# Patient Record
Sex: MALE | Race: BLACK OR AFRICAN AMERICAN | NOT HISPANIC OR LATINO | Employment: FULL TIME | URBAN - METROPOLITAN AREA
[De-identification: names, ages, dates, MRNs, and addresses within clinical notes are randomized per-mention and may not be internally consistent; named-entity substitution may affect disease eponyms.]

---

## 2022-03-08 ENCOUNTER — HOSPITAL ENCOUNTER (EMERGENCY)
Facility: HOSPITAL | Age: 37
Discharge: HOME | End: 2022-03-08
Attending: EMERGENCY MEDICINE
Payer: COMMERCIAL

## 2022-03-08 VITALS
TEMPERATURE: 98 F | DIASTOLIC BLOOD PRESSURE: 102 MMHG | HEART RATE: 89 BPM | WEIGHT: 190 LBS | OXYGEN SATURATION: 99 % | BODY MASS INDEX: 23.62 KG/M2 | HEIGHT: 75 IN | SYSTOLIC BLOOD PRESSURE: 173 MMHG | RESPIRATION RATE: 18 BRPM

## 2022-03-08 DIAGNOSIS — R52 PAIN: ICD-10-CM

## 2022-03-08 DIAGNOSIS — R10.32 LEFT INGUINAL PAIN: Primary | ICD-10-CM

## 2022-03-08 PROCEDURE — 99284 EMERGENCY DEPT VISIT MOD MDM: CPT | Mod: 25

## 2022-03-08 PROCEDURE — 96372 THER/PROPH/DIAG INJ SC/IM: CPT

## 2022-03-08 PROCEDURE — 63600175 PHARM REV CODE 636 W HCPCS: Performed by: EMERGENCY MEDICINE

## 2022-03-08 RX ORDER — DEXAMETHASONE SODIUM PHOSPHATE 4 MG/ML
8 INJECTION, SOLUTION INTRA-ARTICULAR; INTRALESIONAL; INTRAMUSCULAR; INTRAVENOUS; SOFT TISSUE
Status: COMPLETED | OUTPATIENT
Start: 2022-03-08 | End: 2022-03-08

## 2022-03-08 RX ORDER — PREDNISONE 20 MG/1
40 TABLET ORAL DAILY
Qty: 10 TABLET | Refills: 0 | Status: SHIPPED | OUTPATIENT
Start: 2022-03-08 | End: 2022-03-13

## 2022-03-08 RX ADMIN — DEXAMETHASONE SODIUM PHOSPHATE 8 MG: 4 INJECTION, SOLUTION INTRA-ARTICULAR; INTRALESIONAL; INTRAMUSCULAR; INTRAVENOUS; SOFT TISSUE at 01:03

## 2022-03-08 NOTE — ED PROVIDER NOTES
Encounter Date: 3/8/2022       History     Chief Complaint   Patient presents with    Hip Pain     L hip pain x 18 hours, reports no injury      Patient presents complaining of left upper groin pain.  Patient states pain is worse with walking.  Symptoms have been present for the last 2 days.  He denies any trauma.  Denies any fever.  He denies any testicular pain.  He denies any abdominal pain no nausea vomiting.  No fever.        Review of patient's allergies indicates:   Allergen Reactions    [place]     Avocado (laurus persea)     Tree nuts      No past medical history on file.  No past surgical history on file.  No family history on file.     Review of Systems   All other systems reviewed and are negative.      Physical Exam     Initial Vitals [03/08/22 1235]   BP Pulse Resp Temp SpO2   (!) 197/133 98 18 98 °F (36.7 °C) 99 %      MAP       --         Physical Exam    Nursing note and vitals reviewed.  Constitutional: He appears well-developed and well-nourished. He is not diaphoretic. No distress.   Pleasant, polite.   HENT:   Head: Normocephalic and atraumatic.   Eyes: EOM are normal.   Neck: Neck supple.   Normal range of motion.  Pulmonary/Chest: No respiratory distress.   Musculoskeletal:      Cervical back: Normal range of motion and neck supple.      Comments: Patient has full range of motion of the left leg.  There is no erythema warmth or cellulitis to the groin.  There is no evidence of David's.  There is no crepitus.  There is a good pulse.  There is no abdominal tenderness.  He is neurovascularly intact.     Neurological: He is alert.   Skin: Skin is warm and dry.   Psychiatric: He has a normal mood and affect. His behavior is normal. Judgment and thought content normal.         ED Course   Procedures  Labs Reviewed - No data to display       Imaging Results          US Lower Extremity Veins Left (Final result)  Result time 03/08/22 15:29:22    Final result by Urban Brooks MD (03/08/22  15:29:22)                 Narrative:    VENOUS DOPPLER ULTRASOUND LEFT LOWER EXTREMITY    CLINICAL DATA: Left hip pain    FINDINGS: Color and duplex Doppler sonographic assessment with spectral analysis of the left lower extremity demonstrates no evidence of deep vein thrombosis. Normal color Doppler flow, augmentation, and compressibility are noted at all levels.    IMPRESSION:  1. No evidence of DVT in the left lower extremity.    Electronically signed by:  Urban Brooks MD  3/8/2022 3:29 PM CST Workstation: [id number]                             X-Ray Hip 2 or 3 views Left (with Pelvis when performed) (Final result)  Result time 03/08/22 13:21:26    Final result by Jonathon Dorado MD (03/08/22 13:21:26)                 Narrative:    CLINICAL HISTORY:  36 years (1985) Male pain LEFT HIP PAIN X 18 HOURS, NO TRAUMA    TECHNIQUE:  XR HIP 2 OR MORE VIEWS UNILATERAL WITH PELVIS. 4 image(s) obtained.    COMPARISON:  None available.    FINDINGS:  No acute fracture or dislocation. The hip joints, pubic symphysis and SI joints are congruent. There are small overhanging acetabular osteophytes with mild buttressing the superior lateral margin of the femoral necks, a nonspecific finding which may suggest femoral acetabular impingement or very mild osteoarthrosis. The hip joint spaces appear to be symmetric/maintained. Tiny rounded calcifications are seen in the pelvis suggestive of phleboliths. Soft tissues are radiographically within normal limits with no radiopaque foreign body seen.    IMPRESSION:  No acute fracture or dislocation.                  .    Electronically signed by:  Jonathon Dorado MD  3/8/2022 1:21 PM CST Workstation: [id number]                               Medications   dexamethasone injection 8 mg (8 mg Intramuscular Given 3/8/22 1337)     Medical Decision Making:   Differential Diagnosis:   DVT, fracture, dislocation, musculoskeletal pain             ED Course as of 03/08/22 2120   Tue Mar  08, 2022   1536 There is no evidence of DVT on ultrasound and x-ray is within normal limits.  It seems most likely patient experiencing musculoskeletal pain to the left upper groin.  Advised patient prednisone for the next 5 days and gave Decadron shot emergency department.  Additionally patient's blood pressure elevated in the emergency department I advised patient to follow-up with primary care for repeat blood pressure check.  Patient was discharged stable condition.  Detailed return precautions discussed. [AP]      ED Course User Index  [AP] Diogenes Rider MD             Clinical Impression:   Final diagnoses:  [R52] Pain  [R10.32] Left inguinal pain (Primary)          ED Disposition Condition    Discharge Stable        ED Prescriptions     Medication Sig Dispense Start Date End Date Auth. Provider    predniSONE (DELTASONE) 20 MG tablet Take 2 tablets (40 mg total) by mouth once daily. for 5 days 10 tablet 3/8/2022 3/13/2022 Diogenes Rider MD        Follow-up Information     Follow up With Specialties Details Why Contact Info    [facility]  In 1 week  [address] [place] [id number]  [phone]             Diogenes Rider MD  03/08/22 7216

## 2022-03-08 NOTE — DISCHARGE INSTRUCTIONS
Return to the ER if any worsening pain, nausea, vomiting, fever, abdominal pain or any concern.  Keep a log of your Blood pressure readings.  Take it twice a day.  Once in the morning and once at night.  Log date, time, and reading and bring it with you to your follow up visit with your primary care provider.

## 2022-03-11 ENCOUNTER — HOSPITAL ENCOUNTER (EMERGENCY)
Facility: HOSPITAL | Age: 37
Discharge: HOME | End: 2022-03-11
Attending: EMERGENCY MEDICINE
Payer: COMMERCIAL

## 2022-03-11 VITALS
BODY MASS INDEX: 24.37 KG/M2 | TEMPERATURE: 98 F | SYSTOLIC BLOOD PRESSURE: 147 MMHG | OXYGEN SATURATION: 97 % | WEIGHT: 195 LBS | HEART RATE: 116 BPM | RESPIRATION RATE: 17 BRPM | DIASTOLIC BLOOD PRESSURE: 95 MMHG

## 2022-03-11 DIAGNOSIS — S76.012D STRAIN OF FLEXOR MUSCLE OF LEFT HIP, SUBSEQUENT ENCOUNTER: Primary | ICD-10-CM

## 2022-03-11 LAB
CK SERPL-CCNC: 83 U/L (ref 20–200)
CRP SERPL-MCNC: 0.06 MG/DL

## 2022-03-11 PROCEDURE — 82550 ASSAY OF CK (CPK): CPT | Performed by: EMERGENCY MEDICINE

## 2022-03-11 PROCEDURE — 99283 EMERGENCY DEPT VISIT LOW MDM: CPT

## 2022-03-11 PROCEDURE — 86140 C-REACTIVE PROTEIN: CPT | Performed by: EMERGENCY MEDICINE

## 2022-03-11 PROCEDURE — 36415 COLL VENOUS BLD VENIPUNCTURE: CPT | Performed by: EMERGENCY MEDICINE

## 2022-03-11 RX ORDER — METHOCARBAMOL 500 MG/1
1000 TABLET, FILM COATED ORAL 3 TIMES DAILY
Qty: 30 TABLET | Refills: 0 | Status: SHIPPED | OUTPATIENT
Start: 2022-03-11 | End: 2022-03-11 | Stop reason: SDUPTHER

## 2022-03-11 RX ORDER — METHOCARBAMOL 500 MG/1
1000 TABLET, FILM COATED ORAL 3 TIMES DAILY
Qty: 30 TABLET | Refills: 0 | Status: SHIPPED | OUTPATIENT
Start: 2022-03-11 | End: 2022-03-16

## 2022-03-11 RX ORDER — TRAMADOL HYDROCHLORIDE 50 MG/1
50 TABLET ORAL EVERY 6 HOURS PRN
Qty: 16 TABLET | Refills: 0 | Status: SHIPPED | OUTPATIENT
Start: 2022-03-11 | End: 2022-03-11 | Stop reason: SDUPTHER

## 2022-03-11 RX ORDER — TRAMADOL HYDROCHLORIDE 50 MG/1
50 TABLET ORAL EVERY 6 HOURS PRN
Qty: 16 TABLET | Refills: 0 | Status: SHIPPED | OUTPATIENT
Start: 2022-03-11 | End: 2022-03-29

## 2022-03-11 NOTE — ED PROVIDER NOTES
Encounter Date: 3/11/2022       History     Chief Complaint   Patient presents with    Hip Pain      C/o L hip pain. Seen here Tuesday for the same     36-year-old male who was a negative past medical history, presents with complaints of pain to his left anterior hip and proximal thigh.  Patient states the symptoms have been present now for about 5 days.  Four days ago he was seen in the emergency room here and evaluated and placed on prednisone.  He had negative hip x-ray as well as negative ultrasound the leg.  He is scheduled to follow-up with Dr. Gordo Kilpatrick on Monday 03/14/2022.  No complaints of any numbness to the foot or toes.  He denies any lumbar back pain.  No fever or chills.  Patient also been taking meloxicam and ibuprofen along with prednisone.  He did state that he has recently been diagnosed with hypertension.  He denies any trouble urinating or having any problems with bowel movements.  No complaints of any numbness to the foot or toes.  Using crutches now for the last 4 days.  He notes that the majority of his pain is standing erect after being in a seated position.        Review of patient's allergies indicates:   Allergen Reactions    [place]     Avocado (laurus persea)     Tree nuts      No past medical history on file.  No past surgical history on file.  No family history on file.     Review of Systems   Constitutional: Positive for activity change. Negative for chills, diaphoresis and fever.   HENT: Negative for sinus pain, sore throat and trouble swallowing.    Respiratory: Negative for cough and shortness of breath.    Cardiovascular: Negative for chest pain and leg swelling.   Gastrointestinal: Negative for constipation, diarrhea, nausea and vomiting.   Genitourinary: Negative for difficulty urinating, flank pain, frequency and hematuria.   Musculoskeletal: Positive for arthralgias and myalgias.   Skin: Negative for pallor, rash and wound.   Neurological: Negative for headaches.   All  other systems reviewed and are negative.      Physical Exam     Initial Vitals   BP Pulse Resp Temp SpO2   03/11/22 1150 03/11/22 1149 03/11/22 1147 03/11/22 1154 03/11/22 1149   (!) 184/118 (!) 116 18 98.1 °F (36.7 °C) 97 %      MAP       --                Physical Exam    Constitutional: He appears well-developed and well-nourished. He is not diaphoretic. No distress.   HENT:   Head: Normocephalic and atraumatic.   Nose: Nose normal.   Neck: Neck supple. No JVD present.   Normal range of motion.  Cardiovascular: Normal rate, regular rhythm, normal heart sounds and intact distal pulses. Exam reveals no gallop and no friction rub.    No murmur heard.  Pulmonary/Chest: Breath sounds normal. No respiratory distress. He has no wheezes. He has no rhonchi. He has no rales.   Abdominal: Abdomen is soft. Bowel sounds are normal. He exhibits no distension. There is no abdominal tenderness. There is no rebound and no guarding.   Genitourinary:    Penis normal.      Genitourinary Comments: Testicles are both normal.  No evidence of any inguinal hernias.  Patient is unable to flex his left hip while supine.  There is no difficulty with internal and external rotation the hip.  Passively the hip can be flexed.  He is able to maintain flexion against resistance of the hip at 90°.  Quads and hamstrings also appear to be normal.  Peripheral pulses are intact.  Sensations intact.  Is normal range of motion to the foot and toes.  Back is no midline lumbar tenderness or sacral tenderness.     Musculoskeletal:         General: No tenderness or edema. Normal range of motion.      Cervical back: Normal range of motion and neck supple.      Comments: No leg swelling or erythema.     Lymphadenopathy:     He has no cervical adenopathy.   Neurological: He is alert and oriented to person, place, and time. GCS score is 15. GCS eye subscore is 4. GCS verbal subscore is 5. GCS motor subscore is 6.   Skin: Skin is warm and dry. Capillary refill  takes less than 2 seconds. No rash noted. No erythema. No pallor.   Psychiatric: He has a normal mood and affect. His behavior is normal. Judgment and thought content normal.         ED Course   Procedures  Labs Reviewed   C-REACTIVE PROTEIN   CK          Imaging Results    None          Medications - No data to display             Attending Attestation:             Attending ED Notes:   This 36-year-old was a benign past medical history who was seen here approximately 3 days ago for left hip pain during which time he had x-ray of the hip an ultrasound the leg both of which were negative and discharged on prednisone, now has persistent discomfort.  No history of any recurrent injury.  No complaint of any lower back pain.  Bowel and bladder functions have been normal.  No perineal numbness.  The patient denies any numbness to his foot or toes.  He has no calf pain or knee pain.  No ankle pain.  The patient again notices most of his pain when attempting to flex the hip when supine.  He also has difficulty standing from a seated position.  Passively his hip can be fully flexed and rotated without pain.  While supine in the hip passively flexed he is able to resist further flexion without trouble.  Has no problems with adduction abduction.  Inflammatory markers were obtained he has a normal CRP.  The patient will be given further analgesics and muscle relaxers and is advised to continue his prednisone.  Is no evidence to suggest any septic joint or any evidence that would suggest any ileal psoas etiology.  He is scheduled to see Orthopedics within the next few days and is advised to maintain that appointment.  Crutch walking is to be continued.               Clinical Impression:   Final diagnoses:  [S76.012D] Strain of flexor muscle of left hip, subsequent encounter (Primary)          ED Disposition Condition    Discharge Stable        ED Prescriptions     Medication Sig Dispense Start Date End Date Auth. Provider     methocarbamoL (ROBAXIN) 500 MG Tab Take 2 tablets (1,000 mg total) by mouth 3 (three) times daily. for 5 days 30 tablet 3/11/2022 3/16/2022 Alessio Shetty Jr., MD    traMADoL (ULTRAM) 50 mg tablet Take 1 tablet (50 mg total) by mouth every 6 (six) hours as needed for Pain. 16 tablet 3/11/2022  Alessio Shetty Jr., MD        Follow-up Information     Follow up With Specialties Details Why Contact Info    Gordo Kilpatrick MD Orthopedic Surgery, Surgery   [address] [address]  [phone]             Alessio Shetty Jr., MD  03/11/22 1992

## 2022-03-11 NOTE — DISCHARGE INSTRUCTIONS
Follow-up with Dr. Gordo Kilpatrick as previously recommended.  Continue prednisone.  Do not further take Mobic or ibuprofen.  Take tramadol for pain and Robaxin for muscle spasm.  Continue using crutches.  Return if needed for any worsening symptoms.

## 2022-03-23 ENCOUNTER — OFFICE VISIT (OUTPATIENT)
Dept: ORTHOPEDICS | Facility: CLINIC | Age: 37
End: 2022-03-23
Payer: COMMERCIAL

## 2022-03-23 VITALS — HEIGHT: 75 IN | BODY MASS INDEX: 24.25 KG/M2 | WEIGHT: 195 LBS

## 2022-03-23 DIAGNOSIS — M76.891 TENDONITIS OF KNEE, RIGHT: Primary | ICD-10-CM

## 2022-03-23 PROCEDURE — 3008F BODY MASS INDEX DOCD: CPT | Mod: S$GLB,,, | Performed by: ORTHOPAEDIC SURGERY

## 2022-03-23 PROCEDURE — 99203 OFFICE O/P NEW LOW 30 MIN: CPT | Mod: S$GLB,,, | Performed by: ORTHOPAEDIC SURGERY

## 2022-03-23 PROCEDURE — 1160F PR REVIEW ALL MEDS BY PRESCRIBER/CLIN PHARMACIST DOCUMENTED: ICD-10-PCS | Mod: S$GLB,,, | Performed by: ORTHOPAEDIC SURGERY

## 2022-03-23 PROCEDURE — 99203 PR OFFICE/OUTPT VISIT, NEW, LEVL III, 30-44 MIN: ICD-10-PCS | Mod: S$GLB,,, | Performed by: ORTHOPAEDIC SURGERY

## 2022-03-23 PROCEDURE — 1160F RVW MEDS BY RX/DR IN RCRD: CPT | Mod: S$GLB,,, | Performed by: ORTHOPAEDIC SURGERY

## 2022-03-23 PROCEDURE — 3008F PR BODY MASS INDEX (BMI) DOCUMENTED: ICD-10-PCS | Mod: S$GLB,,, | Performed by: ORTHOPAEDIC SURGERY

## 2022-03-23 RX ORDER — IBUPROFEN 200 MG
200 TABLET ORAL EVERY 6 HOURS PRN
COMMUNITY
End: 2022-03-29

## 2022-03-23 RX ORDER — MELOXICAM 7.5 MG/1
7.5 TABLET ORAL 2 TIMES DAILY
Qty: 60 TABLET | Refills: 2 | Status: SHIPPED | OUTPATIENT
Start: 2022-03-23 | End: 2022-04-22

## 2022-03-23 NOTE — PROGRESS NOTES
Subjective:       Patient ID: Deric Oreilly is a 36 y.o. male.    Chief Complaint: Pain of the Left Hip (Left hip groin pain is much better, went to ED twice) and Pain of the Right Knee (Right knee pain x 4 months, c/o pain, swelling, no giving way)      History of Present Illness    Prior to meeting with the patient I reviewed the medical chart in [facility]. This included reviewing the previous progress notes from our office, review of the patient's last appointment with their primary care provider, review of any visits to the emergency room, and review of any pain management appointments or procedures.   This gentleman initially scheduled appointment to be evaluated for acute left hip pain however is left hip pain has resolving he now has complaints of pain in his right knee and right ankle he believes the symptoms may have been initiated by being placed on crutches after being seen in the emergency room for left hip pain and varying excessive weight on right lower extremity no trauma to the right knee or or ankle area however he was seen in the [facility] and [place] several years ago and told that he had severe arthritis of his right ankle and they recommended surgery.    Current Medications  Current Outpatient Medications   Medication Sig Dispense Refill    ibuprofen (ADVIL,MOTRIN) 200 MG tablet Take 200 mg by mouth every 6 (six) hours as needed for Pain.      meloxicam (MOBIC) 7.5 MG tablet Take 1 tablet (7.5 mg total) by mouth 2 (two) times a day. 60 tablet 2    traMADoL (ULTRAM) 50 mg tablet Take 1 tablet (50 mg total) by mouth every 6 (six) hours as needed for Pain. (Patient not taking: Reported on 3/23/2022) 16 tablet 0     No current facility-administered medications for this visit.       Allergies  Review of patient's allergies indicates:   Allergen Reactions    [place]     Avocado (laurus persea)     Tree nuts        Past Medical History  History reviewed. No pertinent past medical history.    Surgical  History  History reviewed. No pertinent surgical history.    Family History:   History reviewed. No pertinent family history.    Social History:   Social History     Socioeconomic History    Marital status: Single       Hospitalization/Major Diagnostic Procedure:     Review of Systems     General/Constitutional:  Chills denies. Fatigue denies. Fever denies. Weight gain denies. Weight loss denies.    Respiratory:  Shortness of breath denies.    Cardiovascular:  Chest pain denies.    Gastrointestinal:  Constipation denies. Diarrhea denies. Nausea denies. Vomiting denies.     Hematology:  Easy bruising denies. Prolonged bleeding denies.     Genitourinary:  Frequent urination denies. Pain in lower back denies. Painful urination denies.     Musculoskeletal:  See HPI for details    Skin:  Rash denies.    Neurologic:  Dizziness denies. Gait abnormalities denies. Seizures denies. Tingling/Numbess denies.    Psychiatric:  Anxiety denies. Depressed mood denies.     Objective:   Vital Signs: There were no vitals filed for this visit.     Physical Exam      General Examination:     Constitutional: The patient is alert and oriented to lace person and time. Mood is pleasant.     Head/Face: Normal facial features normal eyebrows    Eyes: Normal extraocular motion bilaterally    Lungs: Respirations are equal and unlabored    Gait is coordinated.    Cardiovascular: There are no swelling or varicosities present.    Lymphatic: Negative for adenopathy    Skin: Normal    Neurological: Level of consciousness normal. Oriented to place person and time and situation    Psychiatric: Oriented to time place person and situation    Right knee exam shows mild tenderness over the pes bursa and the semimembranosus tendon with a full range of motion no effusion and negative Lachman's and drawer test and no instability.  Right ankle exam shows mild restriction of dorsiflexion and plantar flexion.    XRAY Report/ Interpretation :  AP lateral x-rays  of the right knee were taken today and they appear to be normal      Assessment:       1. Tendonitis of knee, right        Plan:       Deric was seen today for pain and pain.    Diagnoses and all orders for this visit:    Tendonitis of knee, right  -     X-Ray Knee 1 or 2 View Right  -     meloxicam (MOBIC) 7.5 MG tablet; Take 1 tablet (7.5 mg total) by mouth 2 (two) times a day.         No follow-ups on file.    I explained he has some tendinitis in his knee and ankle probably from an altered gait pattern following his left hip condition I placed him on anti-inflammatory medicine advised if this does not resolve he should come back in the [facility] are see a rheumatologist for a workup of multiple arthralgias.  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 Conservative pain intervention and surgical options were discussed in detail. The probability of success of each separate treatment option was discussed. The patient expressed a clear understanding of the treatment options. With regards to surgery, the procedure risk, benefits, complications, and outcomes were discussed. No guarantees were given with regards to surgical outcome.   The risk of complications, morbidity, and mortality of patient management decisions have been made at the time of this visit. These are associated with the patient's problems, diagnostic procedures and treatment options. This includes the possible management options selected and those considered but not selected by the patient after shared medical decision making we discussed with the patient.   This note was created using Dragon voice recognition software that occasionally misinterpreted phrases or words.

## 2022-03-29 ENCOUNTER — OFFICE VISIT (OUTPATIENT)
Dept: FAMILY MEDICINE | Facility: CLINIC | Age: 37
End: 2022-03-29
Payer: COMMERCIAL

## 2022-03-29 VITALS
RESPIRATION RATE: 18 BRPM | SYSTOLIC BLOOD PRESSURE: 146 MMHG | HEART RATE: 108 BPM | WEIGHT: 194.13 LBS | TEMPERATURE: 99 F | BODY MASS INDEX: 24.14 KG/M2 | OXYGEN SATURATION: 98 % | DIASTOLIC BLOOD PRESSURE: 104 MMHG | HEIGHT: 75 IN

## 2022-03-29 DIAGNOSIS — F17.200 TOBACCO DEPENDENCE: ICD-10-CM

## 2022-03-29 DIAGNOSIS — G89.29 CHRONIC PAIN OF RIGHT ANKLE: ICD-10-CM

## 2022-03-29 DIAGNOSIS — M25.50 MULTIPLE JOINT PAIN: Primary | ICD-10-CM

## 2022-03-29 DIAGNOSIS — I10 ESSENTIAL HYPERTENSION: ICD-10-CM

## 2022-03-29 DIAGNOSIS — M25.571 CHRONIC PAIN OF RIGHT ANKLE: ICD-10-CM

## 2022-03-29 PROCEDURE — 3077F SYST BP >= 140 MM HG: CPT | Mod: S$GLB,,, | Performed by: NURSE PRACTITIONER

## 2022-03-29 PROCEDURE — 3008F BODY MASS INDEX DOCD: CPT | Mod: S$GLB,,, | Performed by: NURSE PRACTITIONER

## 2022-03-29 PROCEDURE — 3008F PR BODY MASS INDEX (BMI) DOCUMENTED: ICD-10-PCS | Mod: S$GLB,,, | Performed by: NURSE PRACTITIONER

## 2022-03-29 PROCEDURE — 1160F PR REVIEW ALL MEDS BY PRESCRIBER/CLIN PHARMACIST DOCUMENTED: ICD-10-PCS | Mod: S$GLB,,, | Performed by: NURSE PRACTITIONER

## 2022-03-29 PROCEDURE — 3077F PR MOST RECENT SYSTOLIC BLOOD PRESSURE >= 140 MM HG: ICD-10-PCS | Mod: S$GLB,,, | Performed by: NURSE PRACTITIONER

## 2022-03-29 PROCEDURE — 1160F RVW MEDS BY RX/DR IN RCRD: CPT | Mod: S$GLB,,, | Performed by: NURSE PRACTITIONER

## 2022-03-29 PROCEDURE — 99204 OFFICE O/P NEW MOD 45 MIN: CPT | Mod: S$GLB,,, | Performed by: NURSE PRACTITIONER

## 2022-03-29 PROCEDURE — 3080F DIAST BP >= 90 MM HG: CPT | Mod: S$GLB,,, | Performed by: NURSE PRACTITIONER

## 2022-03-29 PROCEDURE — 3080F PR MOST RECENT DIASTOLIC BLOOD PRESSURE >= 90 MM HG: ICD-10-PCS | Mod: S$GLB,,, | Performed by: NURSE PRACTITIONER

## 2022-03-29 PROCEDURE — 99204 PR OFFICE/OUTPT VISIT, NEW, LEVL IV, 45-59 MIN: ICD-10-PCS | Mod: S$GLB,,, | Performed by: NURSE PRACTITIONER

## 2022-03-29 RX ORDER — AMLODIPINE BESYLATE 5 MG/1
5 TABLET ORAL DAILY
Qty: 30 TABLET | Refills: 2 | Status: SHIPPED | OUTPATIENT
Start: 2022-03-29 | End: 2022-04-29

## 2022-03-29 NOTE — PROGRESS NOTES
SUBJECTIVE:      Patient ID: Deric Oreilly is a 36 y.o. male.    Chief Complaint: Establish Care and Hypertension    Deric is here as an ER follow up from [facility]. He denies any PMH and does not take any medications daily. He was recently seen for joint complaints and was told his blood pressure was high. He does have a fam hx of HTN, but no personal history. He does smoke cigarettes daily. Denies c/o HA, SOB, CP, palpitations, or edema. He recently moved here for [place] and is c/o multiple joint complaints such as his left hip, right knee, and right ankle. He reports intermittent swelling and pains- denies any recent trauma or injury. Seeing ortho for knee pain- told it was tendinitis and was given a shot.     Hypertension  This is a new problem. The current episode started 1 to 4 weeks ago. The problem is unchanged. The problem is uncontrolled. Pertinent negatives include no anxiety, blurred vision, chest pain, headaches, malaise/fatigue, neck pain, palpitations, peripheral edema or shortness of breath. Agents associated with hypertension include NSAIDs. Risk factors for coronary artery disease include male gender, family history and smoking/tobacco exposure. Past treatments include nothing. There is no history of angina, kidney disease, CAD/MI, CVA or retinopathy. There is no history of sleep apnea or a thyroid problem.   Ankle Pain   Incident onset: x 2 years  There was no injury mechanism. The pain is present in the right ankle. The quality of the pain is described as aching. The pain is at a severity of 4/10. The pain is moderate. The pain has been fluctuating since onset. Pertinent negatives include no inability to bear weight, loss of motion, loss of sensation, muscle weakness, numbness or tingling. He reports no foreign bodies present. The symptoms are aggravated by movement and weight bearing. He has tried non-weight bearing, rest, ice and NSAIDs for the symptoms. The treatment provided mild relief.        Family History   Problem Relation Age of Onset    Hypertension Father     Hypertension Paternal Grandmother     Hypertension Paternal Grandfather       Social History     Socioeconomic History    Marital status: Single   Tobacco Use    Smoking status: Current Every Day Smoker     Types: Cigarettes    Smokeless tobacco: Never Used   Substance and Sexual Activity    Alcohol use: Yes    Drug use: Never     Current Outpatient Medications   Medication Sig Dispense Refill    meloxicam (MOBIC) 7.5 MG tablet Take 1 tablet (7.5 mg total) by mouth 2 (two) times a day. 60 tablet 2    amLODIPine (NORVASC) 5 MG tablet Take 1 tablet (5 mg total) by mouth once daily. 30 tablet 2     No current facility-administered medications for this visit.     Review of patient's allergies indicates:   Allergen Reactions    [place]     Avocado (laurus persea)     Tree nuts       History reviewed. No pertinent past medical history.  Past Surgical History:   Procedure Laterality Date    NO PAST SURGERIES         Review of Systems   Constitutional: Negative for appetite change, chills, fatigue, fever, malaise/fatigue and unexpected weight change.   HENT: Negative for congestion and sore throat.    Eyes: Negative for blurred vision, pain and visual disturbance.   Respiratory: Negative for cough and shortness of breath.    Cardiovascular: Negative for chest pain, palpitations and leg swelling.   Gastrointestinal: Negative for abdominal pain, diarrhea, nausea and vomiting.   Endocrine: Negative for cold intolerance, heat intolerance, polydipsia and polyuria.   Genitourinary: Negative for dysuria and frequency.   Musculoskeletal: Positive for arthralgias and joint swelling (right ankle- intermittent ). Negative for back pain, gait problem, myalgias and neck pain.   Skin: Negative for color change and rash.   Neurological: Negative for dizziness, tingling, syncope, weakness, numbness and headaches.   Hematological: Negative for  "adenopathy. Does not bruise/bleed easily.   Psychiatric/Behavioral: Negative for dysphoric mood. The patient is not nervous/anxious.       OBJECTIVE:      Vitals:    03/29/22 1328 03/29/22 1335   BP: (!) 152/104 (!) 146/104   BP Location: Left arm Right arm   Patient Position: Sitting Sitting   BP Method: Medium (Manual) Medium (Manual)   Pulse: 108    Resp: 18    Temp: 98.9 °F (37.2 °C)    TempSrc: Oral    SpO2: 98%    Weight: 88 kg (194 lb 1.6 oz)    Height: 6' 3" (1.905 m)      Physical Exam  Vitals and nursing note reviewed.   Constitutional:       General: He is not in acute distress.     Appearance: Normal appearance. He is well-developed and normal weight. He is not ill-appearing.   HENT:      Head: Normocephalic and atraumatic.      Right Ear: Tympanic membrane, ear canal and external ear normal.      Left Ear: Tympanic membrane, ear canal and external ear normal.      Nose: Nose normal.      Mouth/Throat:      Mouth: Mucous membranes are moist.      Pharynx: Oropharynx is clear. No oropharyngeal exudate.   Eyes:      General: No scleral icterus.     Extraocular Movements: Extraocular movements intact.      Conjunctiva/sclera: Conjunctivae normal.      Pupils: Pupils are equal, round, and reactive to light.   Neck:      Thyroid: No thyroid mass or thyromegaly.      Vascular: No carotid bruit.      Trachea: Trachea normal.   Cardiovascular:      Rate and Rhythm: Normal rate and regular rhythm.      Pulses: Normal pulses.      Heart sounds: Normal heart sounds. No murmur heard.  Pulmonary:      Effort: Pulmonary effort is normal. No respiratory distress.      Breath sounds: Normal breath sounds. No wheezing or rales.   Abdominal:      General: Bowel sounds are normal.      Palpations: Abdomen is soft.      Tenderness: There is no abdominal tenderness.   Musculoskeletal:      Cervical back: Normal range of motion and neck supple.      Right lower leg: No edema.      Left lower leg: No edema.      Right ankle: " No swelling, deformity, ecchymosis or lacerations. No tenderness. Decreased range of motion. Anterior drawer test negative. Normal pulse.      Left ankle: Normal.   Lymphadenopathy:      Cervical: No cervical adenopathy.   Skin:     General: Skin is warm and dry.      Coloration: Skin is not pale.      Findings: No bruising or rash.   Neurological:      Mental Status: He is alert and oriented to person, place, and time.   Psychiatric:         Mood and Affect: Mood normal.         Behavior: Behavior normal.         Thought Content: Thought content normal.         Judgment: Judgment normal.        Assessment:       1. Multiple joint pain    2. Essential hypertension    3. Chronic pain of right ankle    4. Tobacco dependence        Plan:       Multiple joint pain  -     Rheumatoid Factor; Future; Expected date: 03/29/2022  -     CBC Auto Differential; Future; Expected date: 03/29/2022  -     Comprehensive Metabolic Panel; Future; Expected date: 03/29/2022  -     Uric acid; Future; Expected date: 03/29/2022  -     DESHAUN by IFA, w/Rflx; Future; Expected date: 03/29/2022    Essential hypertension  -     CBC Auto Differential; Future; Expected date: 03/29/2022  -     Comprehensive Metabolic Panel; Future; Expected date: 03/29/2022  -     Lipid Panel; Future; Expected date: 03/29/2022  -     TSH; Future; Expected date: 03/29/2022  -     Urinalysis; Future; Expected date: 03/29/2022  -     amLODIPine (NORVASC) 5 MG tablet; Take 1 tablet (5 mg total) by mouth once daily.  Dispense: 30 tablet; Refill: 2  -start CCB daily for HTN; SE and proper use discussed; get labs and diet/lidetsyle changes discussed; recheck in 4 weeks     Chronic pain of right ankle  -     Ambulatory referral/consult to Podiatry; Future; Expected date: 04/05/2022  -     X-Ray Ankle Complete Right; Future; Expected date: 03/29/2022  -xrays ordered; podiatry referral sent     Tobacco dependence   -Counseled on health risks related to tobacco use.  Discussed  smoking cessation including Rx and non-Rx pharmacologic options and non pharmacologic options.      Follow up in about 1 month (around 4/29/2022) for f/u HTN, labs .      3/29/2022 MITCH Zepeda, FNP    This note was created using [employer] voice recognition software that occasionally misinterprets phrases or words.

## 2022-03-30 ENCOUNTER — HOSPITAL ENCOUNTER (OUTPATIENT)
Dept: RADIOLOGY | Facility: HOSPITAL | Age: 37
Discharge: HOME | End: 2022-03-30
Attending: NURSE PRACTITIONER
Payer: COMMERCIAL

## 2022-03-30 ENCOUNTER — PATIENT MESSAGE (OUTPATIENT)
Dept: FAMILY MEDICINE | Facility: CLINIC | Age: 37
End: 2022-03-30

## 2022-03-30 DIAGNOSIS — M25.571 CHRONIC PAIN OF RIGHT ANKLE: ICD-10-CM

## 2022-03-30 DIAGNOSIS — G89.29 CHRONIC PAIN OF RIGHT ANKLE: ICD-10-CM

## 2022-03-30 PROCEDURE — 73610 X-RAY EXAM OF ANKLE: CPT | Mod: TC,RT

## 2022-03-30 NOTE — PROGRESS NOTES
Please notify patient that results are normal wit hte exception of soft tissue swelling- get labs and wear compression sleeve as discussed; elevate and take Mobic with food

## 2022-03-31 ENCOUNTER — HOSPITAL ENCOUNTER (EMERGENCY)
Facility: HOSPITAL | Age: 37
Discharge: HOME | End: 2022-04-01
Attending: EMERGENCY MEDICINE
Payer: COMMERCIAL

## 2022-03-31 DIAGNOSIS — M79.642 BILATERAL HAND PAIN: ICD-10-CM

## 2022-03-31 DIAGNOSIS — M79.641 BILATERAL HAND PAIN: ICD-10-CM

## 2022-03-31 DIAGNOSIS — M13.0 POLYARTHRITIS: Primary | ICD-10-CM

## 2022-03-31 LAB
ALBUMIN SERPL BCP-MCNC: 3.4 G/DL (ref 3.5–5.2)
ALP SERPL-CCNC: 122 U/L (ref 55–135)
ALT SERPL W/O P-5'-P-CCNC: 42 U/L (ref 10–44)
ANION GAP SERPL CALC-SCNC: 12 MMOL/L (ref 8–16)
AST SERPL-CCNC: 90 U/L (ref 10–40)
BASOPHILS # BLD AUTO: 0.03 K/UL (ref 0–0.2)
BASOPHILS NFR BLD: 0.3 % (ref 0–1.9)
BILIRUB SERPL-MCNC: 0.8 MG/DL (ref 0.1–1)
BUN SERPL-MCNC: 9 MG/DL (ref 6–20)
CALCIUM SERPL-MCNC: 8.6 MG/DL (ref 8.7–10.5)
CHLORIDE SERPL-SCNC: 103 MMOL/L (ref 95–110)
CK SERPL-CCNC: 48 U/L (ref 20–200)
CO2 SERPL-SCNC: 20 MMOL/L (ref 23–29)
CREAT SERPL-MCNC: 1.2 MG/DL (ref 0.5–1.4)
CRP SERPL-MCNC: 12.18 MG/DL
DIFFERENTIAL METHOD: ABNORMAL
EOSINOPHIL # BLD AUTO: 0.1 K/UL (ref 0–0.5)
EOSINOPHIL NFR BLD: 0.7 % (ref 0–8)
ERYTHROCYTE [DISTWIDTH] IN BLOOD BY AUTOMATED COUNT: 14.5 % (ref 11.5–14.5)
ERYTHROCYTE [SEDIMENTATION RATE] IN BLOOD BY WESTERGREN METHOD: 43 MM/HR (ref 0–10)
EST. GFR  (AFRICAN AMERICAN): >60 ML/MIN/1.73 M^2
EST. GFR  (NON AFRICAN AMERICAN): >60 ML/MIN/1.73 M^2
GLUCOSE SERPL-MCNC: 120 MG/DL (ref 70–110)
HCT VFR BLD AUTO: 34.2 % (ref 40–54)
HGB BLD-MCNC: 12.2 G/DL (ref 14–18)
IMM GRANULOCYTES # BLD AUTO: 0.05 K/UL (ref 0–0.04)
IMM GRANULOCYTES NFR BLD AUTO: 0.5 % (ref 0–0.5)
LYMPHOCYTES # BLD AUTO: 1.6 K/UL (ref 1–4.8)
LYMPHOCYTES NFR BLD: 17 % (ref 18–48)
MCH RBC QN AUTO: 33.2 PG (ref 27–31)
MCHC RBC AUTO-ENTMCNC: 35.7 G/DL (ref 32–36)
MCV RBC AUTO: 93 FL (ref 82–98)
MONOCYTES # BLD AUTO: 0.7 K/UL (ref 0.3–1)
MONOCYTES NFR BLD: 7.4 % (ref 4–15)
NEUTROPHILS # BLD AUTO: 7.1 K/UL (ref 1.8–7.7)
NEUTROPHILS NFR BLD: 74.1 % (ref 38–73)
NRBC BLD-RTO: 0 /100 WBC
PLATELET # BLD AUTO: 253 K/UL (ref 150–450)
PMV BLD AUTO: 9.6 FL (ref 9.2–12.9)
POTASSIUM SERPL-SCNC: 3.5 MMOL/L (ref 3.5–5.1)
PROT SERPL-MCNC: 7 G/DL (ref 6–8.4)
RBC # BLD AUTO: 3.67 M/UL (ref 4.6–6.2)
SODIUM SERPL-SCNC: 135 MMOL/L (ref 136–145)
URATE SERPL-MCNC: 9.5 MG/DL (ref 3.4–7)
WBC # BLD AUTO: 9.62 K/UL (ref 3.9–12.7)

## 2022-03-31 PROCEDURE — 82550 ASSAY OF CK (CPK): CPT | Performed by: EMERGENCY MEDICINE

## 2022-03-31 PROCEDURE — 85651 RBC SED RATE NONAUTOMATED: CPT | Performed by: EMERGENCY MEDICINE

## 2022-03-31 PROCEDURE — 63600175 PHARM REV CODE 636 W HCPCS: Performed by: EMERGENCY MEDICINE

## 2022-03-31 PROCEDURE — 86140 C-REACTIVE PROTEIN: CPT | Performed by: EMERGENCY MEDICINE

## 2022-03-31 PROCEDURE — 80053 COMPREHEN METABOLIC PANEL: CPT | Performed by: EMERGENCY MEDICINE

## 2022-03-31 PROCEDURE — 84550 ASSAY OF BLOOD/URIC ACID: CPT | Performed by: EMERGENCY MEDICINE

## 2022-03-31 PROCEDURE — 96374 THER/PROPH/DIAG INJ IV PUSH: CPT

## 2022-03-31 PROCEDURE — 99284 EMERGENCY DEPT VISIT MOD MDM: CPT | Mod: 25

## 2022-03-31 PROCEDURE — 85025 COMPLETE CBC W/AUTO DIFF WBC: CPT | Performed by: EMERGENCY MEDICINE

## 2022-03-31 RX ORDER — DEXAMETHASONE SODIUM PHOSPHATE 4 MG/ML
8 INJECTION, SOLUTION INTRA-ARTICULAR; INTRALESIONAL; INTRAMUSCULAR; INTRAVENOUS; SOFT TISSUE
Status: COMPLETED | OUTPATIENT
Start: 2022-03-31 | End: 2022-03-31

## 2022-03-31 RX ADMIN — DEXAMETHASONE SODIUM PHOSPHATE 8 MG: 4 INJECTION, SOLUTION INTRA-ARTICULAR; INTRALESIONAL; INTRAMUSCULAR; INTRAVENOUS; SOFT TISSUE at 10:03

## 2022-04-01 ENCOUNTER — TELEPHONE (OUTPATIENT)
Dept: FAMILY MEDICINE | Facility: CLINIC | Age: 37
End: 2022-04-01
Payer: COMMERCIAL

## 2022-04-01 VITALS
WEIGHT: 195 LBS | OXYGEN SATURATION: 100 % | SYSTOLIC BLOOD PRESSURE: 157 MMHG | BODY MASS INDEX: 24.37 KG/M2 | HEART RATE: 83 BPM | RESPIRATION RATE: 16 BRPM | DIASTOLIC BLOOD PRESSURE: 99 MMHG | TEMPERATURE: 99 F

## 2022-04-01 DIAGNOSIS — M10.9 ACUTE GOUT, UNSPECIFIED CAUSE, UNSPECIFIED SITE: ICD-10-CM

## 2022-04-01 DIAGNOSIS — M05.80 POLYARTHRITIS WITH POSITIVE RHEUMATOID FACTOR: Primary | ICD-10-CM

## 2022-04-01 RX ORDER — PREDNISONE 20 MG/1
40 TABLET ORAL DAILY
Qty: 10 TABLET | Refills: 0 | Status: SHIPPED | OUTPATIENT
Start: 2022-04-01 | End: 2022-04-06

## 2022-04-01 NOTE — ED NOTES
Adult Physical Assessment  LOC: Deric Oreilly, 36 y.o. male verified via two identifiers.  The patient is awake, alert, oriented and speaking appropriately at this time.  APPEARANCE: Patient resting comfortably and appears to be in no acute distress at this time. Patient is clean and well groomed, patient's clothing is properly fastened.  SKIN:The skin is warm and dry, color consistent with ethnicity, patient has normal skin turgor and moist mucus membranes, skin intact, no breakdown or brusing noted.  MUSCULOSKELETAL: Patient moving all extremities well, swelling noted to middle finger joints bilaterally  RESPIRATORY: Airway is open and patent, respirations are spontaneous, patient has a normal effort and rate, no accessory muscle use noted.  CARDIAC: Patient has a normal rate and rhythm, no periphreal edema noted in any extremity, capillary refill < 3 seconds in all extremities  ABDOMEN: Soft and non tender to palpation, no abdominal distention noted. Bowel sounds present in all four quadrants.  NEUROLOGIC: Eyes open spontaneously, behavior appropriate to situation, follows commands, facial expression symmetrical, bilateral hand grasp equal and even, purposeful motor response noted, normal sensation in all extremities when touched with a finger.

## 2022-04-01 NOTE — DISCHARGE INSTRUCTIONS
The cause of your joint inflammation is unclear at this time.  Take steroids as prescribed and follow-up with rheumatology.  Return at once for fever (temperature greater than 100.4° F), worsening symptoms or any other concerning symptoms.

## 2022-04-01 NOTE — TELEPHONE ENCOUNTER
Labs reviewed with patient on the phone today; he was in the ER last night for significant joint swelling-reviewed notes, x-rays, and pictures; he is currently taking steroids and NSAIDs for pain and swelling; positive rheumatoid factor was noted and will give patient rheumatology referral; increase hydration and follow gout diet

## 2022-04-01 NOTE — ED PROVIDER NOTES
Encounter Date: 3/31/2022       History     Chief Complaint   Patient presents with    Swelling     Bilat hand swelling and pain x 2-3 days ago.     36-year-old male with a past medical history of hypertension presents emergency department with bilateral hand pain and swelling.  Patient states that over the past 2-3 days he has had pain and swelling of the joints in his hands.  He has had night sweats the past 2 nights.  He denies any weight loss.  He denies any fever, visual changes or eye pain, or penile discharge.  No recent viral symptoms. He denies any trauma.  No family history of rheumatologic disease.    The patient does state that he has had intermittent right ankle pain, right knee and left hip pain for the past several months.        Review of patient's allergies indicates:   Allergen Reactions    [place]     Avocado (laurus persea)     Tree nuts      No past medical history on file.  Past Surgical History:   Procedure Laterality Date    NO PAST SURGERIES       Family History   Problem Relation Age of Onset    Hypertension Father     Hypertension Paternal Grandmother     Hypertension Paternal Grandfather      Social History     Tobacco Use    Smoking status: Current Every Day Smoker     Types: Cigarettes    Smokeless tobacco: Never Used   Substance Use Topics    Alcohol use: Yes    Drug use: Never     Review of Systems   Constitutional: Negative for chills and fever.   HENT: Negative for sore throat.    Respiratory: Negative for shortness of breath.    Cardiovascular: Negative for chest pain.   Gastrointestinal: Negative for nausea.   Genitourinary: Negative for dysuria.   Musculoskeletal: Positive for arthralgias. Negative for back pain.   Skin: Negative for rash.   Neurological: Negative for weakness and headaches.   Hematological: Does not bruise/bleed easily.   Psychiatric/Behavioral: Negative for confusion.   All other systems reviewed and are negative.      Physical Exam     Initial  Vitals [03/31/22 1929]   BP Pulse Resp Temp SpO2   (!) 190/137 (!) 113 16 98.2 °F (36.8 °C) 100 %      MAP       --         Physical Exam    Nursing note and vitals reviewed.  Constitutional: He appears well-developed and well-nourished. No distress.   HENT:   Head: Normocephalic and atraumatic.   Eyes: Conjunctivae and EOM are normal.   Neck: Neck supple.   Normal range of motion.  Cardiovascular: Normal rate, regular rhythm, normal heart sounds and intact distal pulses.   No murmur heard.  Pulmonary/Chest: Breath sounds normal. No respiratory distress. He has no wheezes. He has no rales.   Abdominal: Abdomen is soft. There is no abdominal tenderness.   Musculoskeletal:         General: Normal range of motion.      Cervical back: Normal range of motion and neck supple.      Comments: Erythema swelling and tenderness to left 2nd, 3rd and 4th MCPs; left 3rd PIP, R 3 MCP     Neurological: He is alert and oriented to person, place, and time. GCS score is 15. GCS eye subscore is 4. GCS verbal subscore is 5. GCS motor subscore is 6.   Skin: Skin is warm and dry. Capillary refill takes less than 2 seconds.   Psychiatric: He has a normal mood and affect.                     ED Course   Procedures  Labs Reviewed   CBC W/ AUTO DIFFERENTIAL - Abnormal; Notable for the following components:       Result Value    RBC 3.67 (*)     Hemoglobin 12.2 (*)     Hematocrit 34.2 (*)     MCH 33.2 (*)     Immature Grans (Abs) 0.05 (*)     Gran % 74.1 (*)     Lymph % 17.0 (*)     All other components within normal limits   COMPREHENSIVE METABOLIC PANEL - Abnormal; Notable for the following components:    Sodium 135 (*)     CO2 20 (*)     Glucose 120 (*)     Calcium 8.6 (*)     Albumin 3.4 (*)     AST 90 (*)     All other components within normal limits   SEDIMENTATION RATE - Abnormal; Notable for the following components:    Sed Rate 43 (*)     All other components within normal limits   C-REACTIVE PROTEIN - Abnormal; Notable for the  following components:    CRP 12.18 (*)     All other components within normal limits   URIC ACID - Abnormal; Notable for the following components:    Uric Acid 9.5 (*)     All other components within normal limits   CK   URINALYSIS, REFLEX TO URINE CULTURE          Imaging Results          X-Ray Hand Complete Bilateral (In process)               X-Rays:   Independently Interpreted Readings:   Other Readings:  X-ray hand with no acute osseous abnormality    Medications   dexamethasone injection 8 mg (8 mg Intravenous Given 3/31/22 2230)     Medical Decision Making:   ED Management:  36-year-old male presents emergency department with 2-3 days of bilateral arthralgias.  Differential includes rheumatoid arthritis, gout, viral synovitis, reactive arthritis, low suspicion for septic arthritis given presentation.  Labs remarkable for normal white blood cell count.  Mild anemia with hemoglobin of 12.2.  Normal platelets.  Mildly elevated AST otherwise normal liver function.  He does have elevated CRP and ESR.  Uric acid is also elevated.  CPK is normal.  The patient had outpatient rheumatoid factor and DESHAUN levels drawn today.  I am unable to see these results in epic.  Will start steroids for symptomatic control and refer to Rheumatology. The patient is aware of and agrees with the plan of care. Detailed return precautions discussed.    Gris Miranda MD  Emergency Medicine  04/01/2022 12:38 AM                            Clinical Impression:   Final diagnoses:  [M79.641, M79.642] Bilateral hand pain  [M13.0] Polyarthritis (Primary)          ED Disposition Condition    Discharge Stable        ED Prescriptions     Medication Sig Dispense Start Date End Date Auth. Provider    predniSONE (DELTASONE) 20 MG tablet Take 2 tablets (40 mg total) by mouth once daily. for 5 days 10 tablet 4/1/2022 4/6/2022 Gris Miranda MD        Follow-up Information     Follow up With Specialties Details Why Contact Info Additional  Information    Buzz Mills MD Rheumatology Schedule an appointment as soon as possible for a visit in 2 days  [address] [facility]  [address]  [facility] [id number]  [phone]       [facility] - Emergency Dept Emergency Medicine  As needed, If symptoms worsen [address] [facility] [id number]  [phone] 1st floor           Gris Miranda MD  04/01/22 0039

## 2022-04-02 LAB
ALBUMIN SERPL-MCNC: 4.3 G/DL (ref 4–5)
ALBUMIN/GLOB SERPL: 1.8 {RATIO} (ref 1.2–2.2)
ALP SERPL-CCNC: 124 IU/L (ref 44–121)
ALT SERPL-CCNC: 25 IU/L (ref 0–44)
ANA SER QL IF: NEGATIVE
APPEARANCE UR: ABNORMAL
AST SERPL-CCNC: 28 IU/L (ref 0–40)
BACTERIA #/AREA URNS HPF: NORMAL /[HPF]
BASOPHILS # BLD AUTO: 0 X10E3/UL (ref 0–0.2)
BASOPHILS NFR BLD AUTO: 0 %
BILIRUB SERPL-MCNC: 0.6 MG/DL (ref 0–1.2)
BILIRUB UR QL STRIP: NEGATIVE
BUN SERPL-MCNC: 7 MG/DL (ref 6–20)
BUN/CREAT SERPL: 6 (ref 9–20)
CALCIUM SERPL-MCNC: 9.5 MG/DL (ref 8.7–10.2)
CASTS URNS QL MICRO: NORMAL /LPF
CHLORIDE SERPL-SCNC: 103 MMOL/L (ref 96–106)
CHOLEST SERPL-MCNC: 194 MG/DL (ref 100–199)
CO2 SERPL-SCNC: 20 MMOL/L (ref 20–29)
COLOR UR: YELLOW
CREAT SERPL-MCNC: 1.27 MG/DL (ref 0.76–1.27)
EOSINOPHIL # BLD AUTO: 0.1 X10E3/UL (ref 0–0.4)
EOSINOPHIL NFR BLD AUTO: 0 %
EPI CELLS #/AREA URNS HPF: NORMAL /HPF (ref 0–10)
ERYTHROCYTE [DISTWIDTH] IN BLOOD BY AUTOMATED COUNT: 14.4 % (ref 11.6–15.4)
EST. GFR  (NO RACE VARIABLE): 76 ML/MIN/1.73
GLOBULIN SER CALC-MCNC: 2.4 G/DL (ref 1.5–4.5)
GLUCOSE SERPL-MCNC: 95 MG/DL (ref 65–99)
GLUCOSE UR QL STRIP: NEGATIVE
HCT VFR BLD AUTO: 42.2 % (ref 37.5–51)
HDLC SERPL-MCNC: 80 MG/DL
HGB BLD-MCNC: 13.8 G/DL (ref 13–17.7)
HGB UR QL STRIP: NEGATIVE
IMM GRANULOCYTES # BLD AUTO: 0.1 X10E3/UL (ref 0–0.1)
IMM GRANULOCYTES NFR BLD AUTO: 1 %
KETONES UR QL STRIP: NEGATIVE
LABORATORY COMMENT REPORT: NORMAL
LDLC SERPL CALC-MCNC: 99 MG/DL (ref 0–99)
LEUKOCYTE ESTERASE UR QL STRIP: NEGATIVE
LYMPHOCYTES # BLD AUTO: 1.4 X10E3/UL (ref 0.7–3.1)
LYMPHOCYTES NFR BLD AUTO: 11 %
MCH RBC QN AUTO: 31.7 PG (ref 26.6–33)
MCHC RBC AUTO-ENTMCNC: 32.7 G/DL (ref 31.5–35.7)
MCV RBC AUTO: 97 FL (ref 79–97)
MICRO URNS: ABNORMAL
MONOCYTES # BLD AUTO: 0.9 X10E3/UL (ref 0.1–0.9)
MONOCYTES NFR BLD AUTO: 7 %
NEUTROPHILS # BLD AUTO: 10.3 X10E3/UL (ref 1.4–7)
NEUTROPHILS NFR BLD AUTO: 81 %
NITRITE UR QL STRIP: NEGATIVE
PH UR STRIP: 5.5 [PH] (ref 5–7.5)
PLATELET # BLD AUTO: 292 X10E3/UL (ref 150–450)
POTASSIUM SERPL-SCNC: 4.2 MMOL/L (ref 3.5–5.2)
PROT SERPL-MCNC: 6.7 G/DL (ref 6–8.5)
PROT UR QL STRIP: ABNORMAL
RBC # BLD AUTO: 4.35 X10E6/UL (ref 4.14–5.8)
RBC #/AREA URNS HPF: NORMAL /HPF (ref 0–2)
RHEUMATOID FACT SERPL-ACNC: 28.4 IU/ML
SODIUM SERPL-SCNC: 140 MMOL/L (ref 134–144)
SP GR UR STRIP: 1.02 (ref 1–1.03)
TRIGL SERPL-MCNC: 83 MG/DL (ref 0–149)
TSH SERPL DL<=0.005 MIU/L-ACNC: 0.81 UIU/ML (ref 0.45–4.5)
URATE SERPL-MCNC: 9.5 MG/DL (ref 3.8–8.4)
UROBILINOGEN UR STRIP-MCNC: 0.2 MG/DL (ref 0.2–1)
VLDLC SERPL CALC-MCNC: 15 MG/DL (ref 5–40)
WBC # BLD AUTO: 12.7 X10E3/UL (ref 3.4–10.8)
WBC #/AREA URNS HPF: NORMAL /HPF (ref 0–5)

## 2022-04-04 ENCOUNTER — LAB VISIT (OUTPATIENT)
Dept: LAB | Facility: HOSPITAL | Age: 37
End: 2022-04-04
Attending: INTERNAL MEDICINE
Payer: COMMERCIAL

## 2022-04-04 ENCOUNTER — OFFICE VISIT (OUTPATIENT)
Dept: RHEUMATOLOGY | Facility: CLINIC | Age: 37
End: 2022-04-04
Payer: COMMERCIAL

## 2022-04-04 ENCOUNTER — TELEPHONE (OUTPATIENT)
Dept: FAMILY MEDICINE | Facility: CLINIC | Age: 37
End: 2022-04-04
Payer: COMMERCIAL

## 2022-04-04 ENCOUNTER — OFFICE VISIT (OUTPATIENT)
Dept: PODIATRY | Facility: CLINIC | Age: 37
End: 2022-04-04
Payer: COMMERCIAL

## 2022-04-04 VITALS — BODY MASS INDEX: 25.74 KG/M2 | WEIGHT: 205.94 LBS

## 2022-04-04 VITALS — HEIGHT: 75 IN | OXYGEN SATURATION: 98 % | HEART RATE: 83 BPM | WEIGHT: 202 LBS | BODY MASS INDEX: 25.12 KG/M2

## 2022-04-04 DIAGNOSIS — M05.80 POLYARTHRITIS WITH POSITIVE RHEUMATOID FACTOR: ICD-10-CM

## 2022-04-04 DIAGNOSIS — M10.9 ACUTE GOUT, UNSPECIFIED CAUSE, UNSPECIFIED SITE: ICD-10-CM

## 2022-04-04 DIAGNOSIS — M1A.0711 IDIOPATHIC CHRONIC GOUT OF RIGHT ANKLE WITH TOPHUS: Primary | ICD-10-CM

## 2022-04-04 DIAGNOSIS — M05.80 POLYARTHRITIS WITH POSITIVE RHEUMATOID FACTOR: Primary | ICD-10-CM

## 2022-04-04 DIAGNOSIS — G89.29 CHRONIC PAIN OF RIGHT ANKLE: ICD-10-CM

## 2022-04-04 DIAGNOSIS — M10.9 GOUTY ARTHRITIS OF RIGHT ANKLE: ICD-10-CM

## 2022-04-04 DIAGNOSIS — M25.571 CHRONIC PAIN OF RIGHT ANKLE: ICD-10-CM

## 2022-04-04 LAB — CCP AB SER IA-ACNC: 0.7 U/ML

## 2022-04-04 PROCEDURE — 99203 OFFICE O/P NEW LOW 30 MIN: CPT | Mod: S$GLB,,, | Performed by: PODIATRIST

## 2022-04-04 PROCEDURE — 99999 PR PBB SHADOW E&M-EST. PATIENT-LVL III: ICD-10-PCS | Mod: PBBFAC,,, | Performed by: INTERNAL MEDICINE

## 2022-04-04 PROCEDURE — 1160F PR REVIEW ALL MEDS BY PRESCRIBER/CLIN PHARMACIST DOCUMENTED: ICD-10-PCS | Mod: CPTII,S$GLB,, | Performed by: PODIATRIST

## 2022-04-04 PROCEDURE — 99999 PR PBB SHADOW E&M-EST. PATIENT-LVL III: CPT | Mod: PBBFAC,,, | Performed by: INTERNAL MEDICINE

## 2022-04-04 PROCEDURE — 1159F MED LIST DOCD IN RCRD: CPT | Mod: CPTII,S$GLB,, | Performed by: PODIATRIST

## 2022-04-04 PROCEDURE — 99204 PR OFFICE/OUTPT VISIT, NEW, LEVL IV, 45-59 MIN: ICD-10-PCS | Mod: S$GLB,,, | Performed by: INTERNAL MEDICINE

## 2022-04-04 PROCEDURE — 84165 PROTEIN E-PHORESIS SERUM: CPT | Performed by: INTERNAL MEDICINE

## 2022-04-04 PROCEDURE — 1160F PR REVIEW ALL MEDS BY PRESCRIBER/CLIN PHARMACIST DOCUMENTED: ICD-10-PCS | Mod: CPTII,S$GLB,, | Performed by: INTERNAL MEDICINE

## 2022-04-04 PROCEDURE — 99203 PR OFFICE/OUTPT VISIT, NEW, LEVL III, 30-44 MIN: ICD-10-PCS | Mod: S$GLB,,, | Performed by: PODIATRIST

## 2022-04-04 PROCEDURE — 3008F PR BODY MASS INDEX (BMI) DOCUMENTED: ICD-10-PCS | Mod: CPTII,S$GLB,, | Performed by: PODIATRIST

## 2022-04-04 PROCEDURE — 99204 OFFICE O/P NEW MOD 45 MIN: CPT | Mod: S$GLB,,, | Performed by: INTERNAL MEDICINE

## 2022-04-04 PROCEDURE — 3008F BODY MASS INDEX DOCD: CPT | Mod: CPTII,S$GLB,, | Performed by: INTERNAL MEDICINE

## 2022-04-04 PROCEDURE — 1159F PR MEDICATION LIST DOCUMENTED IN MEDICAL RECORD: ICD-10-PCS | Mod: CPTII,S$GLB,, | Performed by: INTERNAL MEDICINE

## 2022-04-04 PROCEDURE — 1160F RVW MEDS BY RX/DR IN RCRD: CPT | Mod: CPTII,S$GLB,, | Performed by: PODIATRIST

## 2022-04-04 PROCEDURE — 36415 COLL VENOUS BLD VENIPUNCTURE: CPT | Performed by: INTERNAL MEDICINE

## 2022-04-04 PROCEDURE — 1159F MED LIST DOCD IN RCRD: CPT | Mod: CPTII,S$GLB,, | Performed by: INTERNAL MEDICINE

## 2022-04-04 PROCEDURE — 1159F PR MEDICATION LIST DOCUMENTED IN MEDICAL RECORD: ICD-10-PCS | Mod: CPTII,S$GLB,, | Performed by: PODIATRIST

## 2022-04-04 PROCEDURE — 3008F BODY MASS INDEX DOCD: CPT | Mod: CPTII,S$GLB,, | Performed by: PODIATRIST

## 2022-04-04 PROCEDURE — 86200 CCP ANTIBODY: CPT | Performed by: INTERNAL MEDICINE

## 2022-04-04 PROCEDURE — 3008F PR BODY MASS INDEX (BMI) DOCUMENTED: ICD-10-PCS | Mod: CPTII,S$GLB,, | Performed by: INTERNAL MEDICINE

## 2022-04-04 PROCEDURE — 1160F RVW MEDS BY RX/DR IN RCRD: CPT | Mod: CPTII,S$GLB,, | Performed by: INTERNAL MEDICINE

## 2022-04-04 PROCEDURE — 84165 PATHOLOGIST INTERPRETATION SPE: ICD-10-PCS | Mod: 26,,, | Performed by: PATHOLOGY

## 2022-04-04 PROCEDURE — 84165 PROTEIN E-PHORESIS SERUM: CPT | Mod: 26,,, | Performed by: PATHOLOGY

## 2022-04-04 RX ORDER — ALLOPURINOL 100 MG/1
100 TABLET ORAL DAILY
Qty: 30 TABLET | Refills: 3 | Status: SHIPPED | OUTPATIENT
Start: 2022-04-04 | End: 2022-05-04

## 2022-04-04 RX ORDER — IBUPROFEN 800 MG/1
800 TABLET ORAL 3 TIMES DAILY
Qty: 90 TABLET | Refills: 2 | Status: SHIPPED | OUTPATIENT
Start: 2022-04-04 | End: 2022-05-04

## 2022-04-04 NOTE — TELEPHONE ENCOUNTER
----- Message from CRISTHIAN Horta sent at 4/4/2022  9:05 AM CDT -----  Rheum factor positive as we discussed;  DESHAUN test negative - follow up as planned

## 2022-04-04 NOTE — PATIENT INSTRUCTIONS
"What Is Gout?  Gout is a disease that affects the joints. Left untreated, it can lead to painful foot and joint deformities and even kidney problems. But, by treating gout early, you can relieve pain and help prevent future problems. Gout can usually be treated with medication and proper diet. In severe cases, surgery may be needed.    What causes gout?  Gout is caused by an excess of uric acid (a waste product made by the body). Uric acid is excreted by the kidneys. If the uric acid level in your blood rises too high, the uric acid may form crystals that collect in the joints, bringing on a gout attack. If you have many gout attacks, crystals may form large deposits called tophi. Tophi can damage joints and cause deformity.    Who is at risk for gout?  Men are more likely to have gout than women. But women can also be affected, mostly after menopause. Some health problems, such as obesity and high cholesterol, make gout more likely. And some medications, such as diuretics (water pills), can trigger a gout attack. People who drink a lot of alcohol are at high risk for gout. Certain foods can also trigger a gout attack.    Substances that may trigger a gout attack  To help prevent a gout attack, avoid these foods:  Alcohol (particularly beer, but also red wine and spirits)  Certain meats (red meat, processed meat, turkey)  Organ meats (kidney, liver, sweetbread)  Shellfish (lobster, crab, shrimp, scallop, mussel)  Certain fish (anchovy, sardine, herring, mackerel)     Treatment  Lifestyle changes, including weight loss, exercise, and quitting tobacco use  Reducing consumption of the food groups above as well as high fructose corn syrup, found in many foods including sodas and energy drinks  Changing non-essential medications that may contribute to gout (such as thiazide diuretics--"water pills")  Medications to reduce the amount of uric acid in the blood, such as allopurinol or others  Medications to treat acute " gout attacks, including NSAIDs (nonsteroidal anti-inflammatory medicines), steroids, and colchicine    Date Last Reviewed: 2/1/2016  © 5206-1747 [employer]. [address] [place] [address], [place] [address]. All rights reserved. This information is not intended as a substitute for professional medical care. Always follow your healthcare professional's instructions.       Gout Diet  Gout is a painful condition caused by an excess of uric acid, a waste product made by the body. Uric acid forms crystals that collect in the joints. The immune response to these crystals brings on symptoms of joint pain and swelling. This is called a gout attack. Often, medications and diet changes are combined to manage gout. Below are some guidelines for changing your diet to help you manage gout and prevent attacks. Your health care provider will help you determine the best eating plan for you.     Eating to manage gout  Weight loss for those who are overweight may help reduce gout attacks.    Eat less of these foods  Eating too many foods containing purines may raise the levels of uric acid in your body. This raises your risk for a gout attack. Try to limit these foods and drinks:  Alcohol, such as beer and red wine. You may be told to avoid alcohol completely.  Soft drinks that contain sugar or high fructose corn syrup  Certain fish, including anchovies, sardines, fish eggs, and herring  Shellfish  Certain meats, such as red meat, hot dogs, luncheon meats, and turkey  Organ meats, such as liver, kidneys, and sweetbreads  Legumes, such as dried beans and peas  Other high fat foods such as gravy, whole milk, and high fat cheeses  Vegetables such as asparagus, cauliflower, spinach, and mushrooms used to be thought to contribute to an increased risk for a gout attack, but recent studies show that high purine vegetables don't increase the risk for a gout attack.    Eat more of these foods  Other foods may be helpful for people with  gout. Add some of these foods to your diet:  Cherries contain chemicals that may lower uric acid.  Omega fatty acids. These are found in some fatty fish such as salmon, certain oils (flax, olive, or nut), and nuts themselves. Omega fatty acids may help prevent inflammation due to gout.  Dairy products that are low-fat or fat-free, such as cheese and yogurt  Complex carbohydrate foods, including whole grains, brown rice, oats, and beans  Coffee, in moderation  Water, approximately 64 ounces per day    Follow-up care  Follow up with your healthcare provider as advised.    When to seek medical advice  Call your healthcare provider right away if any of these occur:  Return of gout symptoms, usually at night:  Severe pain, swelling, and heat in a joint, especially the base of the big toe  Affected joint is hard to move  Skin of the affected joint is purple or red  Fever of 100.4°F (38°C) or higher  Pain that doesn't get better even with prescribed medicine     Date Last Reviewed: 1/12/2016  © 6181-6252 [employer] [address] [place] [address]. All rights reserved. This information is not intended as a substitute for professional medical care. Always follow your healthcare professional's instructions.

## 2022-04-04 NOTE — PROGRESS NOTES
"  [address] [facility] [address] [place] [address]  Phone: [phone]   [phone]    Patient's PCP:CRISTHIAN Zepeda  Referring Provider: Cecelia Walker    Subjective:      Chief Complaint:: Ankle Pain (Right foot)    NERISSA Oreilly is a 36 y.o. male who presents today with a complaint of right ankle pain and swelling lasting since about two years ago. Onset of symptoms went to play golf and basketball  and woke up the next morning and ankle was swelling and painful. "I was unable to put pressure on the foot" reports no trauma.  Current symptoms include pain, redness and swelling.  Aggravating factors are "working on my feet for long hours." Symptoms have progressed. Treatment to date have included several ER visits, Mobic, OTC medications, X-rays done 3/29/2022, prednisolone 5-days.          Vitals:    04/04/22 1131   Pulse: 83   SpO2: 98%   Weight: 91.6 kg (202 lb)   Height: 6' 3" (1.905 m)   PainSc:   4      Shoe Size: 12    Past Surgical History:   Procedure Laterality Date    NO PAST SURGERIES       History reviewed. No pertinent past medical history.  Family History   Problem Relation Age of Onset    Hypertension Father     Hypertension Paternal Grandmother     Hypertension Paternal Grandfather         Social History:   Marital Status: Single  Alcohol History:  reports current alcohol use.  Tobacco History:  reports that he has been smoking cigarettes. He has never used smokeless tobacco.  Drug History:  reports no history of drug use.    Review of patient's allergies indicates:   Allergen Reactions    [place]     Avocado (laurus persea)     Tree nuts        Current Outpatient Medications   Medication Sig Dispense Refill    amLODIPine (NORVASC) 5 MG tablet Take 1 tablet (5 mg total) by mouth once daily. 30 tablet 2    meloxicam (MOBIC) 7.5 MG tablet Take 1 tablet (7.5 mg total) by mouth 2 (two) times a day. 60 tablet 2    predniSONE (DELTASONE) 20 MG tablet Take 2 tablets (40 mg " total) by mouth once daily. for 5 days 10 tablet 0     No current facility-administered medications for this visit.       Review of Systems   Constitutional: Negative for chills, fatigue, fever and unexpected weight change.   HENT: Negative for hearing loss and trouble swallowing.    Eyes: Negative for photophobia and visual disturbance.   Respiratory: Negative for cough, shortness of breath and wheezing.    Cardiovascular: Negative for chest pain, palpitations and leg swelling.   Gastrointestinal: Negative for abdominal pain and nausea.   Genitourinary: Negative for dysuria and frequency.   Musculoskeletal: Positive for arthralgias, gait problem and joint swelling. Negative for back pain and myalgias.   Skin: Negative for rash and wound.   Neurological: Negative for seizures, weakness, numbness and headaches.   Hematological: Does not bruise/bleed easily.         Objective:        Physical Exam:   Foot Exam    General  General Appearance: appears stated age and healthy   Orientation: alert and oriented to person, place, and time   Affect: appropriate   Gait: antalgic       Right Foot/Ankle     Inspection and Palpation  Ecchymosis: none  Tenderness: ankle   Swelling: ankle   Arch: normal  Skin Exam: skin intact; no drainage, no ulcer and no erythema   Neurovascular  Dorsalis pedis: 2+  Posterior tibial: 2+  Capillary Refill: 2+  Varicose veins: not present  Saphenous nerve sensation: normal  Tibial nerve sensation: normal  Superficial peroneal nerve sensation: normal  Deep peroneal nerve sensation: normal  Sural nerve sensation: normal    Edema  Type of edema: non-pitting    Muscle Strength  Ankle dorsiflexion: 5  Ankle plantar flexion: 5  Ankle inversion: 5  Ankle eversion: 5  Great toe extension: 5  Great toe flexion: 5    Range of Motion    Normal right ankle ROM  Passive  Ankle dorsiflexion: pain  Ankle plantar flexion: pain  Ankle eversion: pain  Ankle inversion: pain    Active  Ankle dorsiflexion: pain  Ankle  plantar flexion: pain  Ankle eversion: pain  Ankle inversion: pain    Tests  Anterior drawer: negative   Talar tilt: negative   PT Tinel's sign: negative    Paresthesia: negative        Physical Exam  Cardiovascular:      Pulses:           Dorsalis pedis pulses are 2+ on the right side.        Posterior tibial pulses are 2+ on the right side.   Musculoskeletal:      Right ankle: Swelling present. Tenderness present.   Feet:      Right foot:      Skin integrity: No ulcer or erythema.               Right Ankle/Foot Exam     Range of Motion   The patient has normal right ankle ROM.        Muscle Strength   Right Lower Extremity   Ankle Dorsiflexion:  5   Plantar flexion:  5/5    Vascular Exam     Right Pulses  Dorsalis Pedis:      2+  Posterior Tibial:      2+             Imaging: Right ankle 3 views     CLINICAL DATA: Chronic right ankle pain     FINDINGS: 3 views demonstrate no evidence of fracture or dislocation. No osseous destructive lesion is identified. The ankle mortise is within normal limits. There is mild soft tissue swelling over the medial and lateral malleoli.     IMPRESSION:  1. No osseous abnormalities.  2. Mild medial and lateral soft tissue swelling.     Electronically signed by:  Urban Brooks MD  3/30/2022 10:10 AM CDT Workstation: [id number]    On my personal review of the ankle radiographs there is noted to be degenerative changes with spurring along the medial and lateral gutters.  There also appears to be some degeneration of the talar dome medially.           Assessment:       1. 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 Gouty arthritis of right ankle    3. 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    Procedures        I discussed with the patient that it appears  that he is having chronic recurring gout attacks to his right ankle.  I explained the findings of his significantly elevated uric acid.  I also explained that the significant swelling in his left finger is likely due to tophaceous gout as well.    I explained that chronic recurring gout can cause damage to the joints and secondary arthritis.    I discussed with patient that he will need medical therapy to lower his uric acid level and prevent future attacks.  I also discussed with him that once his gout is properly treated we will have to re-evaluate the ankle for the arthritic changes present.    Patient is scheduled to see Rheumatology later today.  We will see if Rheumatology believes there are any additional contributing factors.    Counseling:     I provided patient education verbally regarding:   Patient diagnosis, treatment options, as well as alternatives, risks, and benefits.     causes of gout, diet for gout and oral medication for gout.  I discussed lowering the uric acid level to below 6 and as close to 5.5 to stop uric acid crystal formation.      This note was created using Dragon voice recognition software that occasionally misinterpreted phrases or words.

## 2022-04-05 LAB
ALBUMIN SERPL ELPH-MCNC: 4.14 G/DL (ref 3.35–5.55)
ALPHA1 GLOB SERPL ELPH-MCNC: 0.49 G/DL (ref 0.17–0.41)
ALPHA2 GLOB SERPL ELPH-MCNC: 1.06 G/DL (ref 0.43–0.99)
B-GLOBULIN SERPL ELPH-MCNC: 0.95 G/DL (ref 0.5–1.1)
GAMMA GLOB SERPL ELPH-MCNC: 0.75 G/DL (ref 0.67–1.58)
PROT SERPL-MCNC: 7.4 G/DL (ref 6–8.4)

## 2022-04-05 NOTE — PROGRESS NOTES
History of present illness:  36-year-old gentleman developed the onset of right ankle pain approximately 1 year ago.  The pain began suddenly.  There was swelling with erythema and increased warmth.  He went to the emergency room.  He was given a steroid shot.  The swelling resolved in 2 or 3 days although he has had some discomfort in the ankle since then.  He has had several similar episodes since then.  He also had an episode of podagra.  He had been taking meloxicam and ibuprofen.  The ibuprofen works better.    Approximately 2 months ago he developed swelling in the left 3rd PIP.  This began gradually.  It has subsequently become more painful.  It is been worse since last week.  It then spread to the left elbow.  It then spread to the right 2nd MCP.  He was placed on prednisone, which has been helping.    One month ago he developed left hip pain.  This was also of sudden onset.  He was seen in the emergency room and diagnosed with a strain.  He again was treated with a steroid shot.  This lasted for about a week.  It then spread to the right knee.  He was seen by Orthopedics.  He did not have any swelling at that time.  He was seen by Podiatry yesterday because of his ankle problem.    He has had no problems in the neck and back.  He has had occasional pain in the left shoulder.  He has no morning stiffness.  His pain tends to be worse at the end of the day.  It does wake him up at night.    He has had no unexplained fevers but has occasional night sweats.  He has had no rash, headaches, conjunctivitis, oral ulcers, dry eye or mouth, Raynaud's phenomena, pleurisy, urethral discharge or ulcers, chronic or bloody diarrhea.  He has no history kidney stones.  He has had prior paresthesias in his hands.  He has no thrombophlebitis.  His anti has gout and osteoarthritis.  His father has gout.    Systems review:  General:  Weight has increased 10 lb.  He smokes half-pack cigarettes daily.  He had been drinking up to 3  drinks daily up until last week.  GI:  No abdominal pain or peptic ulcer disease.  No liver problems.  :  No kidney or bladder problems    Physical examination:  Skin:  He has a birthmark near his left elbow  ENT:  Adequate tears in saliva.  No conjunctivitis or oral ulcers.  Chest:  Clear to auscultation and percussion  Cardiac:  No murmurs, gallops, rubs  Abdomen:  No organomegaly or masses.  No tenderness to palpation  Extremities:  No pedal edema  Musculoskeletal:  Cervical spine is unremarkable.  Shoulders have good range of motion without pain on range of motion.  He has pain on range of motion of the left elbow.  He has no swelling or tophi.  Right elbow is unremarkable.  Wrist have good range of motion without pain on range of motion.  He has soft tissue swelling of the right 3rd MCP.  Other joints of the right hand are unremarkable.  He has marked soft tissue swelling of the left 3rd PIP.  He has a flexion deformity.  He has tenderness of the right 3rd and 4th MCP but no definite swelling.  Lumbar spine has good range of motion without pain on range of motion.  Hips have good range of motion without pain on range of motion.  Knees have no swelling.  He has no pain on range of motion.  Ankle:  He has tenderness of the right ankle but no swelling.  Left ankle is unremarkable.  He does have some pain on eversion and inversion.  Small joints the feet are unremarkable.    Radiology:  X-ray of the ankle reveals mild soft tissue swelling but no erosive disease.  X-ray of the knee and hip were unremarkable.  X-ray of the hand shows swelling of the 3rd finger on the left in the 2nd finger on the right but no erosive disease.  Laboratory:  He has hyperuricemia and increased inflammatory markers.  Rheumatoid factor is minimally elevated at 28    Assessment:  1. He has an intermittent migratory arthritis which is compatible with gout.  I did not see any place I could aspirate fluid on exam.  2. He has more persistent  swelling in the fingers compatible with a dactylitis  3. He has chronic problems with the right ankle    Plans:  1. Further laboratory studies obtained    2. Continue ibuprofen, allopurinol, and prednisone as before  3. Return in 1 month

## 2022-04-12 LAB — PATHOLOGIST INTERPRETATION SPE: NORMAL

## 2022-04-26 ENCOUNTER — HOSPITAL ENCOUNTER (EMERGENCY)
Facility: HOSPITAL | Age: 37
Discharge: HOME | End: 2022-04-26
Attending: EMERGENCY MEDICINE
Payer: COMMERCIAL

## 2022-04-26 VITALS
OXYGEN SATURATION: 97 % | BODY MASS INDEX: 24.87 KG/M2 | HEART RATE: 84 BPM | TEMPERATURE: 99 F | DIASTOLIC BLOOD PRESSURE: 116 MMHG | WEIGHT: 200 LBS | HEIGHT: 75 IN | SYSTOLIC BLOOD PRESSURE: 169 MMHG | RESPIRATION RATE: 16 BRPM

## 2022-04-26 DIAGNOSIS — H53.9 VISUAL DISTURBANCE: Primary | ICD-10-CM

## 2022-04-26 LAB
ALBUMIN SERPL BCP-MCNC: 4.2 G/DL (ref 3.5–5.2)
ALP SERPL-CCNC: 59 U/L (ref 55–135)
ALT SERPL W/O P-5'-P-CCNC: 27 U/L (ref 10–44)
ANION GAP SERPL CALC-SCNC: 13 MMOL/L (ref 8–16)
AST SERPL-CCNC: 30 U/L (ref 10–40)
BASOPHILS # BLD AUTO: 0.04 K/UL (ref 0–0.2)
BASOPHILS NFR BLD: 0.5 % (ref 0–1.9)
BILIRUB SERPL-MCNC: 0.4 MG/DL (ref 0.1–1)
BUN SERPL-MCNC: 14 MG/DL (ref 6–20)
CALCIUM SERPL-MCNC: 9.2 MG/DL (ref 8.7–10.5)
CHLORIDE SERPL-SCNC: 104 MMOL/L (ref 95–110)
CO2 SERPL-SCNC: 21 MMOL/L (ref 23–29)
CREAT SERPL-MCNC: 1.3 MG/DL (ref 0.5–1.4)
DIFFERENTIAL METHOD: ABNORMAL
EOSINOPHIL # BLD AUTO: 0.3 K/UL (ref 0–0.5)
EOSINOPHIL NFR BLD: 3.7 % (ref 0–8)
ERYTHROCYTE [DISTWIDTH] IN BLOOD BY AUTOMATED COUNT: 13.2 % (ref 11.5–14.5)
EST. GFR  (AFRICAN AMERICAN): >60 ML/MIN/1.73 M^2
EST. GFR  (NON AFRICAN AMERICAN): >60 ML/MIN/1.73 M^2
GLUCOSE SERPL-MCNC: 93 MG/DL (ref 70–110)
GLUCOSE SERPL-MCNC: 95 MG/DL (ref 70–110)
HCT VFR BLD AUTO: 41.3 % (ref 40–54)
HGB BLD-MCNC: 14.5 G/DL (ref 14–18)
IMM GRANULOCYTES # BLD AUTO: 0.03 K/UL (ref 0–0.04)
IMM GRANULOCYTES NFR BLD AUTO: 0.4 % (ref 0–0.5)
INR PPP: 0.9
LYMPHOCYTES # BLD AUTO: 2.6 K/UL (ref 1–4.8)
LYMPHOCYTES NFR BLD: 31.8 % (ref 18–48)
MCH RBC QN AUTO: 31.9 PG (ref 27–31)
MCHC RBC AUTO-ENTMCNC: 35.1 G/DL (ref 32–36)
MCV RBC AUTO: 91 FL (ref 82–98)
MONOCYTES # BLD AUTO: 0.6 K/UL (ref 0.3–1)
MONOCYTES NFR BLD: 7.5 % (ref 4–15)
NEUTROPHILS # BLD AUTO: 4.5 K/UL (ref 1.8–7.7)
NEUTROPHILS NFR BLD: 56.1 % (ref 38–73)
NRBC BLD-RTO: 0 /100 WBC
PLATELET # BLD AUTO: 273 K/UL (ref 150–450)
PMV BLD AUTO: 10.1 FL (ref 9.2–12.9)
POTASSIUM SERPL-SCNC: 3.2 MMOL/L (ref 3.5–5.1)
PROT SERPL-MCNC: 7.1 G/DL (ref 6–8.4)
PROTHROMBIN TIME: 11.6 SEC (ref 11.4–13.7)
RBC # BLD AUTO: 4.55 M/UL (ref 4.6–6.2)
SODIUM SERPL-SCNC: 138 MMOL/L (ref 136–145)
WBC # BLD AUTO: 8.05 K/UL (ref 3.9–12.7)

## 2022-04-26 PROCEDURE — 93010 ELECTROCARDIOGRAM REPORT: CPT | Mod: ,,, | Performed by: INTERNAL MEDICINE

## 2022-04-26 PROCEDURE — 93005 ELECTROCARDIOGRAM TRACING: CPT | Performed by: INTERNAL MEDICINE

## 2022-04-26 PROCEDURE — 80053 COMPREHEN METABOLIC PANEL: CPT | Performed by: NURSE PRACTITIONER

## 2022-04-26 PROCEDURE — 63600175 PHARM REV CODE 636 W HCPCS: Performed by: EMERGENCY MEDICINE

## 2022-04-26 PROCEDURE — 96374 THER/PROPH/DIAG INJ IV PUSH: CPT

## 2022-04-26 PROCEDURE — 25000003 PHARM REV CODE 250: Performed by: EMERGENCY MEDICINE

## 2022-04-26 PROCEDURE — 82962 GLUCOSE BLOOD TEST: CPT

## 2022-04-26 PROCEDURE — 99285 EMERGENCY DEPT VISIT HI MDM: CPT | Mod: 25

## 2022-04-26 PROCEDURE — 93010 EKG 12-LEAD: ICD-10-PCS | Mod: ,,, | Performed by: INTERNAL MEDICINE

## 2022-04-26 PROCEDURE — 85610 PROTHROMBIN TIME: CPT | Performed by: NURSE PRACTITIONER

## 2022-04-26 PROCEDURE — 85025 COMPLETE CBC W/AUTO DIFF WBC: CPT | Performed by: NURSE PRACTITIONER

## 2022-04-26 RX ORDER — LORAZEPAM 2 MG/ML
1 INJECTION INTRAMUSCULAR
Status: COMPLETED | OUTPATIENT
Start: 2022-04-26 | End: 2022-04-26

## 2022-04-26 RX ORDER — POTASSIUM CHLORIDE 750 MG/1
50 CAPSULE, EXTENDED RELEASE ORAL ONCE
Status: COMPLETED | OUTPATIENT
Start: 2022-04-26 | End: 2022-04-26

## 2022-04-26 RX ORDER — ASPIRIN 325 MG
325 TABLET ORAL
Status: COMPLETED | OUTPATIENT
Start: 2022-04-26 | End: 2022-04-26

## 2022-04-26 RX ADMIN — POTASSIUM CHLORIDE 50 MEQ: 750 CAPSULE, EXTENDED RELEASE ORAL at 10:04

## 2022-04-26 RX ADMIN — LORAZEPAM 1 MG: 2 INJECTION INTRAMUSCULAR; INTRAVENOUS at 10:04

## 2022-04-26 RX ADMIN — ASPIRIN 325 MG ORAL TABLET 325 MG: 325 PILL ORAL at 10:04

## 2022-04-27 NOTE — ED PROVIDER NOTES
"Encounter Date: 4/26/2022       History     Chief Complaint   Patient presents with    Eye Problem     LEFT, STATES HAS A "BLIND SPOT" IN HIS VISION, RT UPPER QUAD. ONSET WHEN HE WOKE UP     36-year-old male presented emergency department with a blind spot in his left eye which she noticed this morning when he woke up.  Patient was recently diagnosed of having hypertension.  Patient denies any chest pain or shortness of breath or weakness or numbness.  Denies any change in speech.  Denies fever or chills.  Denies nausea vomiting.  Patient said on the upper medial aspect of his left eye there is a spot where he could not see.        Review of patient's allergies indicates:   Allergen Reactions    [place]     Avocado (laurus persea)     Tree nuts      Past Medical History:   Diagnosis Date    Hypertension      Past Surgical History:   Procedure Laterality Date    NO PAST SURGERIES       Family History   Problem Relation Age of Onset    Hypertension Father     Hypertension Paternal Grandmother     Hypertension Paternal Grandfather      Social History     Tobacco Use    Smoking status: Current Every Day Smoker     Types: Cigarettes    Smokeless tobacco: Never Used   Substance Use Topics    Alcohol use: Yes    Drug use: Never     Review of Systems   Constitutional: Negative.    HENT: Negative.    Eyes: Positive for visual disturbance. Negative for photophobia, pain, discharge, redness and itching.   Respiratory: Negative.    Cardiovascular: Negative.    Gastrointestinal: Negative.    Endocrine: Negative.    Genitourinary: Negative.    Skin: Negative.    Allergic/Immunologic: Negative.    Neurological: Negative.    Hematological: Negative.    Psychiatric/Behavioral: Negative.    All other systems reviewed and are negative.      Physical Exam     Initial Vitals [04/26/22 1805]   BP Pulse Resp Temp SpO2   (!) 174/115 107 20 98.5 °F (36.9 °C) 98 %      MAP       --         Physical Exam    Nursing note and vitals " reviewed.  Constitutional: He appears well-developed and well-nourished.   HENT:   Head: Normocephalic and atraumatic.   Nose: Nose normal.   Mouth/Throat: No oropharyngeal exudate.   Eyes: Conjunctivae and EOM are normal. Pupils are equal, round, and reactive to light. Right eye exhibits no discharge. Left eye exhibits no discharge. No scleral icterus.   Visual field deficit in the left eye medial upper field which patient said was there when he woke up this morning   Neck: No tracheal deviation present.   Normal range of motion.  Cardiovascular: Normal rate, regular rhythm, normal heart sounds and intact distal pulses. Exam reveals no friction rub.    No murmur heard.  Pulmonary/Chest: Breath sounds normal. No respiratory distress. He has no wheezes. He has no rales.   Abdominal: Abdomen is soft. He exhibits no distension. There is no abdominal tenderness.   Musculoskeletal:         General: Normal range of motion.      Cervical back: Normal range of motion.     Neurological: He is alert and oriented to person, place, and time. He has normal strength.   Skin: Skin is warm and dry. Capillary refill takes less than 2 seconds.   Psychiatric: He has a normal mood and affect. Thought content normal.         ED Course   Critical Care    Date/Time: 4/26/2022 10:02 PM  Performed by: Lucas Mcdonough MD  Authorized by: Lucas Mcdonough MD   Direct patient critical care time: 10 minutes  Documentation critical care time: 5 minutes  Consulting other physicians critical care time: 10 minutes  Total critical care time (exclusive of procedural time) : 25 minutes        Labs Reviewed   CBC W/ AUTO DIFFERENTIAL - Abnormal; Notable for the following components:       Result Value    RBC 4.55 (*)     MCH 31.9 (*)     All other components within normal limits   COMPREHENSIVE METABOLIC PANEL - Abnormal; Notable for the following components:    Potassium 3.2 (*)     CO2 21 (*)     All other components within normal limits   PROTIME-INR   POCT  GLUCOSE   POCT GLUCOSE MONITORING CONTINUOUS     EKG Readings: (Independently Interpreted)   Initial Reading: No STEMI. Rhythm: Normal Sinus Rhythm. Ectopy: No Ectopy. Conduction: Normal.       Imaging Results          MRI Brain Without Contrast (Final result)  Result time 04/26/22 20:58:16    Final result by Wyatt Alcazar DO (04/26/22 20:58:16)                 Narrative:    EXAM: MR BRAIN WITHOUT IV CONTRAST    HISTORY: Headache, new or worsening, neuro deficit (Age 19-49y)    COMPARISON: CT head 4/26/2022    TECHNIQUE: Multisequence, multiplanar MRI of the brain per institutional protocol. No intravenous contrast was administered.    FINDINGS:    Parenchyma: No acute hemorrhage, infarction, or mass.    Ventricles and extra-axial spaces: Appropriate for age.    Orbits: Normal.    Visualized paranasal sinuses: Clear.    Mastoid air cells: Clear.    Bones: Normal.    Additional comment: None.    IMPRESSION:  No evidence of acute intracranial process.    Electronically signed by:  Wyatt Alcazar DO  4/26/2022 8:58 PM CDT Workstation: [id number]                             CT Head Without Contrast (Final result)  Result time 04/26/22 18:36:02    Final result by Shaan Grigsby MD (04/26/22 18:36:02)                 Narrative:    CMS MANDATED QUALITY DATA - CT RADIATION - 436    All CT scans at this facility utilize dose modulation, iterative reconstruction, and/or weight based dosing when appropriate to reduce radiation dose to as low as reasonably achievable.        Reason: VISON LOSS    TECHNIQUE: Head CT without IV contrast.    COMPARISON: None    FINDINGS:    Gray-white differentiation is maintained without hemorrhage, midline shift, or mass effect.    The ventricles and cisterns are maintained.    Calvarium is intact. Visualized sinuses are clear.    IMPRESSION:    No acute intracranial abnormality.        Electronically signed by:  Shaan Grigsby DO  4/26/2022 6:36 PM CDT Workstation: [id number]                                Medications   potassium chloride CR capsule 50 mEq (has no administration in time range)   aspirin tablet 325 mg (has no administration in time range)   lorazepam injection 1 mg (has no administration in time range)     Medical Decision Making:   Differential Diagnosis:   Left eye visual field deficit noted.  Case discussed with neurologist on-call Dr. Dario pacheco wanted a CT and MRI to rule out stroke and CT and MRI done which did not show any evidence of stroke and she recommended that patient would need further evaluation by Ophthalmology and if patient can be transferred to transfer for ophthalmology evaluation but if could not be transferred to discharge patient for ophthalmology evaluation tomorrow.  I gave the options to patient and will attempt to transfer patient for further ophthalmology evaluation however I believe this started t patient anxious so anxiety treated.  This could be a retinal artery branch occlusion versus retinal vein branch occlusion.  Fundus exam did not show any acute abnormalities.  Patient does not have any pain in the eye.    Clinical Tests:   Lab Tests: Reviewed  Radiological Study: Reviewed  Medical Tests: Reviewed             ED Course as of 04/26/22 2206 Tue Apr 26, 2022 2204 Visual acuity 20/15 both eyes [UM]      ED Course User Index  [UM] Lucas Mcdonough MD             Clinical Impression:   Final diagnoses:  [H53.9] Visual disturbance (Primary)          ED Disposition Condition    Transfer to Another Facility Stable              Lucas Mcdonough MD  04/26/22 2202       Lucas Mcdonough MD  04/26/22 2207

## 2022-04-29 ENCOUNTER — OFFICE VISIT (OUTPATIENT)
Dept: FAMILY MEDICINE | Facility: CLINIC | Age: 37
End: 2022-04-29
Payer: COMMERCIAL

## 2022-04-29 VITALS
DIASTOLIC BLOOD PRESSURE: 102 MMHG | WEIGHT: 201.63 LBS | HEIGHT: 75 IN | TEMPERATURE: 99 F | OXYGEN SATURATION: 98 % | HEART RATE: 98 BPM | BODY MASS INDEX: 25.07 KG/M2 | RESPIRATION RATE: 18 BRPM | SYSTOLIC BLOOD PRESSURE: 156 MMHG

## 2022-04-29 DIAGNOSIS — M10.9 ACUTE GOUT, UNSPECIFIED CAUSE, UNSPECIFIED SITE: ICD-10-CM

## 2022-04-29 DIAGNOSIS — I10 ESSENTIAL HYPERTENSION: Primary | ICD-10-CM

## 2022-04-29 DIAGNOSIS — H53.9 VISUAL DISTURBANCE: ICD-10-CM

## 2022-04-29 PROCEDURE — 1160F RVW MEDS BY RX/DR IN RCRD: CPT | Mod: S$GLB,,, | Performed by: NURSE PRACTITIONER

## 2022-04-29 PROCEDURE — 99213 OFFICE O/P EST LOW 20 MIN: CPT | Mod: S$GLB,,, | Performed by: NURSE PRACTITIONER

## 2022-04-29 PROCEDURE — 3080F DIAST BP >= 90 MM HG: CPT | Mod: S$GLB,,, | Performed by: NURSE PRACTITIONER

## 2022-04-29 PROCEDURE — 3077F PR MOST RECENT SYSTOLIC BLOOD PRESSURE >= 140 MM HG: ICD-10-PCS | Mod: S$GLB,,, | Performed by: NURSE PRACTITIONER

## 2022-04-29 PROCEDURE — 99213 PR OFFICE/OUTPT VISIT, EST, LEVL III, 20-29 MIN: ICD-10-PCS | Mod: S$GLB,,, | Performed by: NURSE PRACTITIONER

## 2022-04-29 PROCEDURE — 3080F PR MOST RECENT DIASTOLIC BLOOD PRESSURE >= 90 MM HG: ICD-10-PCS | Mod: S$GLB,,, | Performed by: NURSE PRACTITIONER

## 2022-04-29 PROCEDURE — 1160F PR REVIEW ALL MEDS BY PRESCRIBER/CLIN PHARMACIST DOCUMENTED: ICD-10-PCS | Mod: S$GLB,,, | Performed by: NURSE PRACTITIONER

## 2022-04-29 PROCEDURE — 3008F PR BODY MASS INDEX (BMI) DOCUMENTED: ICD-10-PCS | Mod: S$GLB,,, | Performed by: NURSE PRACTITIONER

## 2022-04-29 PROCEDURE — 3077F SYST BP >= 140 MM HG: CPT | Mod: S$GLB,,, | Performed by: NURSE PRACTITIONER

## 2022-04-29 PROCEDURE — 3008F BODY MASS INDEX DOCD: CPT | Mod: S$GLB,,, | Performed by: NURSE PRACTITIONER

## 2022-04-29 RX ORDER — AMLODIPINE BESYLATE 10 MG/1
10 TABLET ORAL DAILY
Qty: 30 TABLET | Refills: 1 | Status: SHIPPED | OUTPATIENT
Start: 2022-04-29

## 2022-04-29 NOTE — PROGRESS NOTES
SUBJECTIVE:      Patient ID: Deric Oreilly is a 36 y.o. male.    Chief Complaint: Hypertension    Deric is here for follow up on HTN. He is taking the amlodipine as prescribed daily- no SE or complaints. BP is improving but still elevated. Since last visit, he has seen rheumatology and was started on allopurinol for gout. Has not been complaint with diet but reports joint pain/swelling has been better. Will give list of gout triggering foods.     C/o vision disturbance in left eye over the past few days- was seen in ER and had neg CT head and MRI brain. Saw ophthalmology at [facility] [place] [id number] in [place]- was told either has an occluded vessel or had a stroke in his due to hypertension.  As a procedure next week scheduled and we will need to get records to review.     Hypertension  This is a new problem. The current episode started more than 1 month ago. The problem has been gradually improving since onset. The problem is uncontrolled. Associated symptoms include blurred vision. Pertinent negatives include no anxiety, chest pain, headaches, malaise/fatigue, neck pain, palpitations, peripheral edema, shortness of breath or sweats. Agents associated with hypertension include NSAIDs. Risk factors for coronary artery disease include male gender, family history and smoking/tobacco exposure. Past treatments include calcium channel blockers and lifestyle changes. The current treatment provides mild improvement. There is no history of angina, kidney disease, CAD/MI, CVA, heart failure or retinopathy. There is no history of sleep apnea or a thyroid problem.       Family History   Problem Relation Age of Onset    Hypertension Father     Hypertension Paternal Grandmother     Hypertension Paternal Grandfather       Social History     Socioeconomic History    Marital status: Single   Tobacco Use    Smoking status: Current Every Day Smoker     Types: Cigarettes    Smokeless tobacco: Never Used   Substance and Sexual Activity     Alcohol use: Yes    Drug use: Never     Current Outpatient Medications   Medication Sig Dispense Refill    allopurinoL (ZYLOPRIM) 100 MG tablet Take 1 tablet (100 mg total) by mouth once daily. 30 tablet 3    ascorbic acid, vitamin C, (VITAMIN C) 100 MG tablet Take 100 mg by mouth once daily.      ibuprofen (ADVIL,MOTRIN) 800 MG tablet Take 1 tablet (800 mg total) by mouth 3 (three) times daily. 90 tablet 2    amLODIPine (NORVASC) 10 MG tablet Take 1 tablet (10 mg total) by mouth once daily. 30 tablet 1     No current facility-administered medications for this visit.     Review of patient's allergies indicates:   Allergen Reactions    [place]     Avocado (laurus persea)     Tree nuts       Past Medical History:   Diagnosis Date    Hypertension      Past Surgical History:   Procedure Laterality Date    NO PAST SURGERIES         Review of Systems   Constitutional: Negative for appetite change, chills, fatigue, fever, malaise/fatigue and unexpected weight change.   HENT: Negative for congestion and sore throat.    Eyes: Positive for blurred vision and visual disturbance. Negative for photophobia, pain, discharge and redness.   Respiratory: Negative for cough and shortness of breath.    Cardiovascular: Negative for chest pain, palpitations and leg swelling.   Gastrointestinal: Negative for abdominal pain, diarrhea, nausea and vomiting.   Endocrine: Negative for cold intolerance, heat intolerance, polydipsia and polyuria.   Genitourinary: Negative for dysuria and frequency.   Musculoskeletal: Positive for arthralgias and joint swelling (better/improving ). Negative for back pain, gait problem, myalgias and neck pain.   Neurological: Negative for dizziness, weakness and headaches.   Hematological: Negative for adenopathy. Does not bruise/bleed easily.   Psychiatric/Behavioral: Negative for dysphoric mood. The patient is not nervous/anxious.       OBJECTIVE:      Vitals:    04/29/22 0841   BP: (!) 156/100   BP  "Location: Left arm   Patient Position: Sitting   BP Method: Large (Manual)   Pulse: 98   Resp: 18   Temp: 98.5 °F (36.9 °C)   TempSrc: Oral   SpO2: 98%   Weight: 91.4 kg (201 lb 9.6 oz)   Height: 6' 3" (1.905 m)     Physical Exam  Vitals and nursing note reviewed.   Constitutional:       General: He is not in acute distress.     Appearance: Normal appearance. He is well-developed and normal weight. He is not ill-appearing.   HENT:      Head: Normocephalic.      Mouth/Throat:      Mouth: Mucous membranes are moist.      Pharynx: No oropharyngeal exudate.   Eyes:      General: No scleral icterus.     Pupils: Pupils are equal, round, and reactive to light.   Neck:      Thyroid: No thyroid mass or thyromegaly.      Vascular: No carotid bruit.      Trachea: Trachea normal.   Cardiovascular:      Rate and Rhythm: Normal rate and regular rhythm.      Heart sounds: Normal heart sounds. No murmur heard.  Pulmonary:      Effort: Pulmonary effort is normal. No respiratory distress.      Breath sounds: Normal breath sounds. No wheezing or rales.   Abdominal:      General: Bowel sounds are normal.      Palpations: Abdomen is soft.      Tenderness: There is no abdominal tenderness.   Musculoskeletal:         General: Normal range of motion.      Cervical back: Normal range of motion and neck supple.      Right lower leg: No edema.      Left lower leg: No edema.   Lymphadenopathy:      Cervical: No cervical adenopathy.   Skin:     General: Skin is warm and dry.      Coloration: Skin is not jaundiced or pale.   Neurological:      Mental Status: He is alert and oriented to person, place, and time.   Psychiatric:         Mood and Affect: Mood normal.         Behavior: Behavior normal.         Thought Content: Thought content normal.         Judgment: Judgment normal.        Assessment:       1. Essential hypertension    2. Visual disturbance    3. Acute gout, unspecified cause, unspecified site        Plan:       Essential " hypertension  -     amLODIPine (NORVASC) 10 MG tablet; Take 1 tablet (10 mg total) by mouth once daily.  Dispense: 30 tablet; Refill: 1  -increase to 10 mg daily and recheck in 2 weeks    Visual disturbance   -GET RECORDS    Acute gout, unspecified cause, unspecified site   -compliance with diet; cont f/u with rheum       No follow-ups on file.      4/29/2022 MITCH Zepeda, FNP    This note was created using [employer] voice recognition software that occasionally misinterprets phrases or words.

## 2022-05-03 ENCOUNTER — OFFICE VISIT (OUTPATIENT)
Dept: RHEUMATOLOGY | Facility: CLINIC | Age: 37
End: 2022-05-03
Payer: COMMERCIAL

## 2022-05-03 ENCOUNTER — LAB VISIT (OUTPATIENT)
Dept: LAB | Facility: HOSPITAL | Age: 37
End: 2022-05-03
Attending: INTERNAL MEDICINE
Payer: COMMERCIAL

## 2022-05-03 VITALS
BODY MASS INDEX: 25.46 KG/M2 | SYSTOLIC BLOOD PRESSURE: 156 MMHG | WEIGHT: 203.69 LBS | HEART RATE: 101 BPM | DIASTOLIC BLOOD PRESSURE: 108 MMHG

## 2022-05-03 DIAGNOSIS — M79.642 PAIN IN BOTH HANDS: Primary | ICD-10-CM

## 2022-05-03 DIAGNOSIS — M79.641 PAIN IN BOTH HANDS: ICD-10-CM

## 2022-05-03 DIAGNOSIS — M79.642 PAIN IN BOTH HANDS: ICD-10-CM

## 2022-05-03 DIAGNOSIS — M79.641 PAIN IN BOTH HANDS: Primary | ICD-10-CM

## 2022-05-03 LAB
CRP SERPL-MCNC: 30.4 MG/L (ref 0–8.2)
ERYTHROCYTE [SEDIMENTATION RATE] IN BLOOD BY WESTERGREN METHOD: 6 MM/HR (ref 0–23)
URATE SERPL-MCNC: 6.3 MG/DL (ref 3.4–7)

## 2022-05-03 PROCEDURE — 84550 ASSAY OF BLOOD/URIC ACID: CPT | Performed by: INTERNAL MEDICINE

## 2022-05-03 PROCEDURE — 99214 OFFICE O/P EST MOD 30 MIN: CPT | Mod: S$PBB,,, | Performed by: INTERNAL MEDICINE

## 2022-05-03 PROCEDURE — 85652 RBC SED RATE AUTOMATED: CPT | Performed by: INTERNAL MEDICINE

## 2022-05-03 PROCEDURE — 86140 C-REACTIVE PROTEIN: CPT | Performed by: INTERNAL MEDICINE

## 2022-05-03 PROCEDURE — 99999 PR PBB SHADOW E&M-EST. PATIENT-LVL III: CPT | Mod: PBBFAC,,, | Performed by: INTERNAL MEDICINE

## 2022-05-03 PROCEDURE — 99214 PR OFFICE/OUTPT VISIT, EST, LEVL IV, 30-39 MIN: ICD-10-PCS | Mod: S$PBB,,, | Performed by: INTERNAL MEDICINE

## 2022-05-03 PROCEDURE — 36415 COLL VENOUS BLD VENIPUNCTURE: CPT | Performed by: INTERNAL MEDICINE

## 2022-05-03 PROCEDURE — 99999 PR PBB SHADOW E&M-EST. PATIENT-LVL III: ICD-10-PCS | Mod: PBBFAC,,, | Performed by: INTERNAL MEDICINE

## 2022-05-04 NOTE — PROGRESS NOTES
History of present illness:  36-year-old gentleman I saw for the 1st time last a month.  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  At the time of his visit he had pain on range of motion of the left elbow but no swelling.  He had swelling of the right 3rd MCP.  He had swelling of the left 3rd PIP with a flexion deformity.  He had tenderness in the right ankle.  I did not find any fluid to aspirate.  He had hyperuricemia and increased inflammatory markers.  I felt that gouty arthritis was a reasonable diagnosis.  I continued him on ibuprofen, allopurinol, and prednisone.    The swelling in his right hand improved.  He still has the swelling in the left 3rd finger.  He is still had some pain in the right hand.  He is no longer taking prednisone and has noticed no difference.  He remains on ibuprofen 3 times daily.  It is helping except for the finger.  He is on a allopurinol 100 mg daily.    He awoke last week with a visual field defect.  He was seen in the emergency room.  He is scheduled to see Ophthalmology tomorrow.    Physical examination:  Musculoskeletal:  Shoulders, elbows, wrists are unremarkable.  He has soft tissue swelling of the right 2nd and 3rd MCP and PIP but I would not call it a true dactylitis.  He has bony hypertrophy of the left 3rd PIP but no definite synovitis.  Knees, ankles, small joints the feet are unremarkable.    Assessment:  I still think we are most likely dealing with gouty arthritis    Plans:  1. Laboratory studies obtained  2. I have ordered MRI of the hands  3. Continue medications as before  4. Return to see me in 1 month, although he may be returning to [place].